# Patient Record
Sex: FEMALE | Race: WHITE | NOT HISPANIC OR LATINO | Employment: PART TIME | URBAN - METROPOLITAN AREA
[De-identification: names, ages, dates, MRNs, and addresses within clinical notes are randomized per-mention and may not be internally consistent; named-entity substitution may affect disease eponyms.]

---

## 2018-04-03 LAB
ABSOL LYMPHOCYTES (HISTORICAL): 2.6 K/UL (ref 0.5–4)
ALBUMIN SERPL BCP-MCNC: 4 G/DL (ref 3–5.2)
ALP SERPL-CCNC: 55 U/L (ref 43–122)
ALT SERPL W P-5'-P-CCNC: 41 U/L (ref 9–52)
AST SERPL W P-5'-P-CCNC: 30 U/L (ref 14–36)
BASOPHILS # BLD AUTO: 0 K/UL (ref 0–0.1)
BASOPHILS # BLD AUTO: 1 % (ref 0–1)
BILIRUB SERPL-MCNC: 0.1 MG/DL
BILIRUBIN DIRECT (HISTORICAL): 0.1 MG/DL
CHOLEST SERPL-MCNC: 212 MG/DL
CHOLEST/HDLC SERPL: 3.3 {RATIO}
DEPRECATED RDW RBC AUTO: 13.3 %
EOSINOPHIL # BLD AUTO: 0.1 K/UL (ref 0–0.4)
EOSINOPHIL NFR BLD AUTO: 2 % (ref 0–6)
HCT VFR BLD AUTO: 39.9 % (ref 36–46)
HDLC SERPL-MCNC: 64 MG/DL
HGB BLD-MCNC: 13.3 G/DL (ref 12–16)
LDL/HDL RATIO (HISTORICAL): 1.3
LDLC SERPL CALC-MCNC: 85 MG/DL
LYMPHOCYTES NFR BLD AUTO: 46 % (ref 25–45)
MCH RBC QN AUTO: 30.6 PG (ref 26–34)
MCHC RBC AUTO-ENTMCNC: 33.3 % (ref 31–36)
MCV RBC AUTO: 92 FL (ref 80–100)
MONOCYTES # BLD AUTO: 0.5 K/UL (ref 0.2–0.9)
MONOCYTES NFR BLD AUTO: 9 % (ref 1–10)
NEUTROPHILS ABS COUNT (HISTORICAL): 2.4 K/UL (ref 1.8–7.8)
NEUTS SEG NFR BLD AUTO: 42 % (ref 45–65)
PLATELET # BLD AUTO: 229 K/MCL (ref 150–450)
RBC # BLD AUTO: 4.34 M/MCL (ref 4–5.2)
TOTAL PROTEIN (HISTORICAL): 6.6 G/DL (ref 5.9–8.4)
TRIGL SERPL-MCNC: 315 MG/DL
VLDLC SERPL CALC-MCNC: 63 MG/DL (ref 0–40)
WBC # BLD AUTO: 5.7 K/MCL (ref 4.5–11)

## 2018-05-05 LAB
ABSOL LYMPHOCYTES (HISTORICAL): 2.4 K/UL (ref 0.5–4)
ALBUMIN SERPL BCP-MCNC: 4.2 G/DL (ref 3–5.2)
ALP SERPL-CCNC: 44 U/L (ref 43–122)
ALT SERPL W P-5'-P-CCNC: 34 U/L (ref 9–52)
AST SERPL W P-5'-P-CCNC: 29 U/L (ref 14–36)
BASOPHILS # BLD AUTO: 0 % (ref 0–1)
BASOPHILS # BLD AUTO: 0 K/UL (ref 0–0.1)
BILIRUB SERPL-MCNC: 0.3 MG/DL
BILIRUBIN DIRECT (HISTORICAL): 0.2 MG/DL
CHOLEST SERPL-MCNC: 228 MG/DL
CHOLEST/HDLC SERPL: 4.4 {RATIO}
DEPRECATED RDW RBC AUTO: 13.2 %
EOSINOPHIL # BLD AUTO: 0.1 K/UL (ref 0–0.4)
EOSINOPHIL NFR BLD AUTO: 2 % (ref 0–6)
HCT VFR BLD AUTO: 39.7 % (ref 36–46)
HDLC SERPL-MCNC: 52 MG/DL
HGB BLD-MCNC: 13.3 G/DL (ref 12–16)
LDL/HDL RATIO (HISTORICAL): 2.7
LDLC SERPL CALC-MCNC: 141 MG/DL
LYMPHOCYTES NFR BLD AUTO: 40 % (ref 25–45)
MCH RBC QN AUTO: 30.9 PG (ref 26–34)
MCHC RBC AUTO-ENTMCNC: 33.6 % (ref 31–36)
MCV RBC AUTO: 92 FL (ref 80–100)
MONOCYTES # BLD AUTO: 0.4 K/UL (ref 0.2–0.9)
MONOCYTES NFR BLD AUTO: 6 % (ref 1–10)
NEUTROPHILS ABS COUNT (HISTORICAL): 3.2 K/UL (ref 1.8–7.8)
NEUTS SEG NFR BLD AUTO: 52 % (ref 45–65)
PLATELET # BLD AUTO: 229 K/MCL (ref 150–450)
RBC # BLD AUTO: 4.32 M/MCL (ref 4–5.2)
TOTAL PROTEIN (HISTORICAL): 6.9 G/DL (ref 5.9–8.4)
TRIGL SERPL-MCNC: 174 MG/DL
VLDLC SERPL CALC-MCNC: 35 MG/DL (ref 0–40)
WBC # BLD AUTO: 6.1 K/MCL (ref 4.5–11)

## 2018-05-31 LAB
ABSOL LYMPHOCYTES (HISTORICAL): 3 K/UL (ref 0.5–4)
ALT SERPL W P-5'-P-CCNC: 29 U/L (ref 9–52)
AST SERPL W P-5'-P-CCNC: 22 U/L (ref 14–36)
BASOPHILS # BLD AUTO: 0 % (ref 0–1)
BASOPHILS # BLD AUTO: 0 K/UL (ref 0–0.1)
DEPRECATED RDW RBC AUTO: 13.1 %
EOSINOPHIL # BLD AUTO: 0.1 K/UL (ref 0–0.4)
EOSINOPHIL NFR BLD AUTO: 1 % (ref 0–6)
HCT VFR BLD AUTO: 40.3 % (ref 36–46)
HGB BLD-MCNC: 13.6 G/DL (ref 12–16)
LYMPHOCYTES NFR BLD AUTO: 49 % (ref 25–45)
MCH RBC QN AUTO: 31.8 PG (ref 26–34)
MCHC RBC AUTO-ENTMCNC: 33.9 % (ref 31–36)
MCV RBC AUTO: 94 FL (ref 80–100)
MONOCYTES # BLD AUTO: 0.4 K/UL (ref 0.2–0.9)
MONOCYTES NFR BLD AUTO: 6 % (ref 1–10)
NEUTROPHILS ABS COUNT (HISTORICAL): 2.8 K/UL (ref 1.8–7.8)
NEUTS SEG NFR BLD AUTO: 44 % (ref 45–65)
PLATELET # BLD AUTO: 248 K/MCL (ref 150–450)
RBC # BLD AUTO: 4.3 M/MCL (ref 4–5.2)
TRIGL SERPL-MCNC: 133 MG/DL
WBC # BLD AUTO: 6.2 K/MCL (ref 4.5–11)

## 2018-06-26 ENCOUNTER — OFFICE VISIT (OUTPATIENT)
Dept: FAMILY MEDICINE CLINIC | Facility: CLINIC | Age: 53
End: 2018-06-26
Payer: COMMERCIAL

## 2018-06-26 VITALS
OXYGEN SATURATION: 98 % | TEMPERATURE: 97.6 F | DIASTOLIC BLOOD PRESSURE: 86 MMHG | RESPIRATION RATE: 14 BRPM | HEART RATE: 73 BPM | HEIGHT: 64 IN | WEIGHT: 176.8 LBS | SYSTOLIC BLOOD PRESSURE: 128 MMHG | BODY MASS INDEX: 30.19 KG/M2

## 2018-06-26 DIAGNOSIS — L70.0 ACNE VULGARIS: Primary | ICD-10-CM

## 2018-06-26 DIAGNOSIS — R21 RASH: ICD-10-CM

## 2018-06-26 PROBLEM — R79.89 LOW VITAMIN D LEVEL: Status: ACTIVE | Noted: 2018-06-26

## 2018-06-26 PROBLEM — G47.00 INSOMNIA: Status: ACTIVE | Noted: 2018-02-06

## 2018-06-26 PROBLEM — L70.9 ACNE: Status: ACTIVE | Noted: 2018-02-06

## 2018-06-26 PROBLEM — G43.009 MIGRAINE WITHOUT AURA OR STATUS MIGRAINOSUS: Status: ACTIVE | Noted: 2018-06-26

## 2018-06-26 PROCEDURE — 99214 OFFICE O/P EST MOD 30 MIN: CPT | Performed by: INTERNAL MEDICINE

## 2018-06-26 RX ORDER — ERGOCALCIFEROL 1.25 MG/1
CAPSULE ORAL
Refills: 3 | COMMUNITY
Start: 2018-06-18 | End: 2018-08-01 | Stop reason: SDUPTHER

## 2018-06-26 RX ORDER — CLINDAMYCIN PHOSPHATE 10 MG/G
GEL TOPICAL 2 TIMES DAILY
Qty: 30 G | Refills: 0 | Status: SHIPPED | OUTPATIENT
Start: 2018-06-26 | End: 2018-08-01 | Stop reason: SURG

## 2018-06-26 RX ORDER — CEFUROXIME AXETIL 500 MG/1
500 TABLET ORAL DAILY
Qty: 30 TABLET | Refills: 1 | Status: SHIPPED | OUTPATIENT
Start: 2018-06-26 | End: 2018-07-26

## 2018-06-26 RX ORDER — ZOLMITRIPTAN 2.5 MG/1
TABLET, FILM COATED ORAL
Refills: 3 | COMMUNITY
Start: 2018-06-18 | End: 2018-10-13 | Stop reason: SDUPTHER

## 2018-06-26 RX ORDER — ISOTRETINOIN 40 MG/1
40 CAPSULE ORAL DAILY
Refills: 0 | COMMUNITY
Start: 2018-06-05 | End: 2019-04-17

## 2018-06-26 NOTE — PROGRESS NOTES
Assessment/Plan:         Diagnoses and all orders for this visit:    Acne vulgaris  -     Ambulatory referral to Allergy; Future  -     clindamycin (CLINDAGEL) 1 % gel; Apply topically 2 (two) times a day  -     cefuroxime (CEFTIN) 500 mg tablet; Take 1 tablet (500 mg total) by mouth daily for 30 days    RTC in 1 mo  Rash  -     Ambulatory referral to Allergy; Future  -     cefuroxime (CEFTIN) 500 mg tablet; Take 1 tablet (500 mg total) by mouth daily for 30 days    Other orders  -     ergocalciferol (VITAMIN D2) 50,000 units; TAKE ONE CAPSULE BY MOUTH ONE TIME PER WEEK  -     ISOtretinoin (ACCUTANE) 40 MG capsule; Take 40 mg by mouth daily  -     ZOLMitriptan (ZOMIG) 2 5 MG tablet; TAKE 1 TABLET BY MOUTH ONCE-MAY REPEAT AFTER 2 HOURS IF NEEDED,NO MORE THAN 10 MG/24 HRS        Subjective:      Patient ID: Laurel Hernandes is a 50 y o  female  68-year-old lady with long history of rash mainly on her face upper extremities, she was seen by a dermatologist is on acutane  at this time for the last 3 months,  With no significant improvement  She is tired of having this issue without any improvement  She would like to see or seek any help regarding this rash  Med list, allergy ,review of system all done in detail  And no other significant problems under previous blood test ultrasound and cardiac workup all reviewed in detail with patient  Rash   Pertinent negatives include no congestion, cough, diarrhea, eye pain, fatigue, fever, shortness of breath or sore throat  The following portions of the patient's history were reviewed and updated as appropriate: allergies, current medications, past family history, past medical history, past social history, past surgical history and problem list     Review of Systems   Constitutional: Negative for chills, fatigue and fever  HENT: Negative for congestion, facial swelling, sore throat, trouble swallowing and voice change      Eyes: Negative for pain, discharge and visual disturbance  Respiratory: Negative for cough, shortness of breath and wheezing  Cardiovascular: Negative for chest pain, palpitations and leg swelling  Gastrointestinal: Negative for abdominal pain, blood in stool, constipation, diarrhea and nausea  Endocrine: Negative for polydipsia, polyphagia and polyuria  Genitourinary: Negative for difficulty urinating, hematuria and urgency  Musculoskeletal: Negative for arthralgias and myalgias  Skin: Positive for rash  Neurological: Negative for dizziness, tremors, weakness and headaches  Hematological: Negative for adenopathy  Does not bruise/bleed easily  Psychiatric/Behavioral: Negative for dysphoric mood, sleep disturbance and suicidal ideas  Objective:      /86 (BP Location: Left arm, Patient Position: Sitting, Cuff Size: Adult)   Pulse 73   Temp 97 6 °F (36 4 °C) (Oral)   Resp 14   Ht 5' 4" (1 626 m)   Wt 80 2 kg (176 lb 12 8 oz)   LMP 06/06/2018 (Approximate)   SpO2 98%   Breastfeeding? No   BMI 30 35 kg/m²          Physical Exam   Constitutional: She is oriented to person, place, and time  She appears well-nourished  No distress  HENT:   Head: Normocephalic  Mouth/Throat: Oropharynx is clear and moist  No oropharyngeal exudate  Eyes: Conjunctivae are normal  Pupils are equal, round, and reactive to light  No scleral icterus  Neck: Neck supple  No thyromegaly present  Cardiovascular: Normal rate, regular rhythm and normal heart sounds  No murmur heard  Pulmonary/Chest: Effort normal and breath sounds normal  No respiratory distress  She has no wheezes  She has no rales  Abdominal: Soft  Bowel sounds are normal  She exhibits no distension  There is no tenderness  There is no rebound and no guarding  Musculoskeletal: She exhibits no edema  Lymphadenopathy:     She has no cervical adenopathy  Neurological: She is alert and oriented to person, place, and time  Skin: Rash noted   Rash is maculopapular  There is erythema  Psychiatric: She has a normal mood and affect

## 2018-06-26 NOTE — PATIENT INSTRUCTIONS
Dermatitis   AMBULATORY CARE:   Dermatitis  is skin inflammation  You may have an itchy rash, redness, or swelling  You may also have bumps or blisters that crust over or ooze clear fluid  Call 911 if you have any of the following symptoms of anaphylaxis:   · Sudden trouble breathing    · Throat swelling and tightness    · Dizziness, lightheadedness, fainting, or confusion  Seek care immediately if:   · You develop a fever or have red streaks going up your arm or leg  · Your rash gets more swollen, red, or hot  Contact your healthcare provider if:   · Your skin blisters, oozes white or yellow pus, or has a foul-smelling discharge  · Your rash spreads or does not get better, even after treatment  · You have questions or concerns about your condition or care  Treatment for dermatitis  depends on the cause of your rash  You may need medicines to help decrease itching and inflammation or treat a bacterial infection  They may be given as a topical cream, shot, or a pill  Manage dermatitis:   · Apply a cool compress to your rash  This will help soothe your skin  · Keep your skin moist   Rub unscented cream or lotion on your skin to prevent dryness and itching  Do this right after a lukewarm bath or shower when your skin is still damp  · Avoid skin irritants  Do not use skin irritants, such as makeup, hair products, soaps, and cleansers  Use products that do not contain fragrances or dye  Follow up with your healthcare provider as directed:  Write down your questions so you remember to ask them during your visits  © 2017 [employer] Information is for End User's use only and may not be sold, redistributed or otherwise used for commercial purposes  All illustrations and images included in CareNotes® are the copyrighted property of [employer] , [employer]  or Faust[employer] Camilo  The above information is an  only   It is not intended as medical advice for individual conditions or treatments  Talk to your doctor, nurse or pharmacist before following any medical regimen to see if it is safe and effective for you

## 2018-07-03 ENCOUNTER — APPOINTMENT (OUTPATIENT)
Dept: LAB | Facility: HOSPITAL | Age: 53
End: 2018-07-03
Payer: COMMERCIAL

## 2018-07-03 ENCOUNTER — TRANSCRIBE ORDERS (OUTPATIENT)
Dept: ADMINISTRATIVE | Facility: HOSPITAL | Age: 53
End: 2018-07-03

## 2018-07-03 DIAGNOSIS — L90.5 SCAR CONDITION AND FIBROSIS OF SKIN: ICD-10-CM

## 2018-07-03 DIAGNOSIS — L98.8 VASCULAR DISORDER OF SKIN: ICD-10-CM

## 2018-07-03 DIAGNOSIS — L98.8 VASCULAR DISORDER OF SKIN: Primary | ICD-10-CM

## 2018-07-03 LAB
ALT SERPL W P-5'-P-CCNC: 36 U/L (ref 9–52)
AST SERPL W P-5'-P-CCNC: 26 U/L (ref 14–36)
BASOPHILS # BLD AUTO: 0 THOUSANDS/ΜL (ref 0–0.1)
BASOPHILS NFR BLD AUTO: 0 % (ref 0–1)
EOSINOPHIL # BLD AUTO: 0.1 THOUSAND/ΜL (ref 0–0.4)
EOSINOPHIL NFR BLD AUTO: 2 % (ref 0–6)
ERYTHROCYTE [DISTWIDTH] IN BLOOD BY AUTOMATED COUNT: 13.3 %
HCT VFR BLD AUTO: 39.7 % (ref 36–46)
HGB BLD-MCNC: 13.3 G/DL (ref 12–16)
LYMPHOCYTES # BLD AUTO: 2.6 THOUSANDS/ΜL (ref 0.5–4)
LYMPHOCYTES NFR BLD AUTO: 47 % (ref 20–50)
MCH RBC QN AUTO: 31 PG (ref 26–34)
MCHC RBC AUTO-ENTMCNC: 33.5 G/DL (ref 31–36)
MCV RBC AUTO: 93 FL (ref 80–100)
MONOCYTES # BLD AUTO: 0.3 THOUSAND/ΜL (ref 0.2–0.9)
MONOCYTES NFR BLD AUTO: 6 % (ref 1–10)
NEUTROPHILS # BLD AUTO: 2.5 THOUSANDS/ΜL (ref 1.8–7.8)
NEUTS SEG NFR BLD AUTO: 44 % (ref 45–65)
PLATELET # BLD AUTO: 245 THOUSANDS/UL (ref 150–450)
PMV BLD AUTO: 9.8 FL (ref 8.9–12.7)
RBC # BLD AUTO: 4.29 MILLION/UL (ref 4–5.2)
TRIGL SERPL-MCNC: 128 MG/DL
WBC # BLD AUTO: 5.6 THOUSAND/UL (ref 4.5–11)

## 2018-07-03 PROCEDURE — 84460 ALANINE AMINO (ALT) (SGPT): CPT

## 2018-07-03 PROCEDURE — 84450 TRANSFERASE (AST) (SGOT): CPT

## 2018-07-03 PROCEDURE — 84478 ASSAY OF TRIGLYCERIDES: CPT

## 2018-07-03 PROCEDURE — 36415 COLL VENOUS BLD VENIPUNCTURE: CPT

## 2018-07-03 PROCEDURE — 85025 COMPLETE CBC W/AUTO DIFF WBC: CPT

## 2018-07-06 ENCOUNTER — TRANSCRIBE ORDERS (OUTPATIENT)
Dept: ADMINISTRATIVE | Facility: HOSPITAL | Age: 53
End: 2018-07-06

## 2018-07-06 ENCOUNTER — APPOINTMENT (OUTPATIENT)
Dept: LAB | Facility: HOSPITAL | Age: 53
End: 2018-07-06
Payer: COMMERCIAL

## 2018-07-06 DIAGNOSIS — L90.5 SCAR CONDITION AND FIBROSIS OF SKIN: Primary | ICD-10-CM

## 2018-07-06 DIAGNOSIS — L90.5 SCAR CONDITION AND FIBROSIS OF SKIN: ICD-10-CM

## 2018-07-06 PROCEDURE — 87116 MYCOBACTERIA CULTURE: CPT

## 2018-07-06 PROCEDURE — 36415 COLL VENOUS BLD VENIPUNCTURE: CPT

## 2018-07-10 ENCOUNTER — APPOINTMENT (OUTPATIENT)
Dept: LAB | Facility: HOSPITAL | Age: 53
End: 2018-07-10
Payer: COMMERCIAL

## 2018-07-10 DIAGNOSIS — L98.8 VASCULAR DISORDER OF SKIN: ICD-10-CM

## 2018-07-10 DIAGNOSIS — L90.5 SCAR CONDITION AND FIBROSIS OF SKIN: ICD-10-CM

## 2018-07-10 PROCEDURE — 36415 COLL VENOUS BLD VENIPUNCTURE: CPT

## 2018-07-10 PROCEDURE — 86480 TB TEST CELL IMMUN MEASURE: CPT

## 2018-07-12 LAB
ANNOTATION COMMENT IMP: NORMAL
GAMMA INTERFERON BACKGROUND BLD IA-ACNC: 0.07 IU/ML
M TB IFN-G BLD-IMP: NEGATIVE
M TB IFN-G CD4+ BCKGRND COR BLD-ACNC: 0 IU/ML
M TB IFN-G CD4+ T-CELLS BLD-ACNC: 0.07 IU/ML
MITOGEN IGNF BLD-ACNC: 7.98 IU/ML
QUANTIFERON-TB GOLD IN TUBE: NORMAL
SERVICE CMNT-IMP: NORMAL

## 2018-08-01 ENCOUNTER — OFFICE VISIT (OUTPATIENT)
Dept: FAMILY MEDICINE CLINIC | Facility: CLINIC | Age: 53
End: 2018-08-01
Payer: COMMERCIAL

## 2018-08-01 VITALS
HEIGHT: 64 IN | WEIGHT: 169 LBS | OXYGEN SATURATION: 100 % | DIASTOLIC BLOOD PRESSURE: 82 MMHG | HEART RATE: 76 BPM | BODY MASS INDEX: 28.85 KG/M2 | RESPIRATION RATE: 14 BRPM | TEMPERATURE: 98 F | SYSTOLIC BLOOD PRESSURE: 128 MMHG

## 2018-08-01 DIAGNOSIS — E55.9 VITAMIN D DEFICIENCY: Primary | ICD-10-CM

## 2018-08-01 DIAGNOSIS — L93.0 LUPUS ERYTHEMATOSUS, UNSPECIFIED FORM: ICD-10-CM

## 2018-08-01 DIAGNOSIS — R21 RASH: ICD-10-CM

## 2018-08-01 PROCEDURE — 99214 OFFICE O/P EST MOD 30 MIN: CPT | Performed by: INTERNAL MEDICINE

## 2018-08-01 RX ORDER — ERGOCALCIFEROL 1.25 MG/1
50000 CAPSULE ORAL WEEKLY
Qty: 4 CAPSULE | Refills: 3 | Status: SHIPPED | OUTPATIENT
Start: 2018-08-01 | End: 2018-08-07 | Stop reason: SDUPTHER

## 2018-08-01 NOTE — PROGRESS NOTES
Assessment/Plan:         Diagnoses and all orders for this visit:    Vitamin D deficiency :  -     ergocalciferol (VITAMIN D2) 50,000 units; Take 1 capsule (50,000 Units total) by mouth once a week  RTC in 3 mos w blood work    Lupus erythematosus, unspecified form; ?? Rheumatology consult   -     Sedimentation rate, automated; Future  -     HEATHER Screen w/ Reflex to Titer/Pattern; Future  -     HLA-B27 DNA Typing; Future  -     Anti-DNA antibody, double-stranded; Future  -     Basic metabolic panel; Future  -     CBC and differential; Future  -     Urinalysis with microscopic  -     Hepatic function panel; Future  -     Ambulatory referral to Rheumatology; Future    Rash: ?? R/O skin lupus     -     Sedimentation rate, automated; Future        Subjective:      Patient ID: Becky Heller is a 50 y o  female  Nice 50 y o lady with chronic recurrent skin rash    She has been seen dermatology    Recent blood work and med list reviewed w pt in detail           The following portions of the patient's history were reviewed and updated as appropriate: allergies, current medications, past family history, past medical history, past social history, past surgical history and problem list     Review of Systems   Constitutional: Positive for fatigue  Negative for chills and fever  HENT: Positive for postnasal drip and rhinorrhea  Negative for congestion, facial swelling, sore throat, trouble swallowing and voice change  Eyes: Negative for pain, discharge and visual disturbance  Respiratory: Negative for cough, shortness of breath and wheezing  Cardiovascular: Negative for chest pain, palpitations and leg swelling  Gastrointestinal: Negative for abdominal pain, blood in stool, constipation, diarrhea and nausea  Endocrine: Negative for polydipsia, polyphagia and polyuria  Genitourinary: Negative for difficulty urinating, hematuria and urgency  Musculoskeletal: Positive for arthralgias and myalgias     Skin: Positive for rash  Pt has been seen dermatology, skin bx showed  Possible ,Lupus ? ? Neurological: Negative for dizziness, tremors, weakness and headaches  Hematological: Negative for adenopathy  Does not bruise/bleed easily  Psychiatric/Behavioral: Negative for dysphoric mood, sleep disturbance and suicidal ideas  Objective:      /82 (BP Location: Left arm, Patient Position: Sitting, Cuff Size: Standard)   Pulse 76   Temp 98 °F (36 7 °C) (Oral)   Resp 14   Ht 5' 4" (1 626 m)   Wt 76 7 kg (169 lb)   SpO2 100%   BMI 29 01 kg/m²          Physical Exam   Constitutional: She is oriented to person, place, and time  She appears well-nourished  No distress  HENT:   Head: Normocephalic  Mouth/Throat: Oropharynx is clear and moist  No oropharyngeal exudate  Eyes: Conjunctivae are normal  Pupils are equal, round, and reactive to light  No scleral icterus  Neck: Neck supple  No thyromegaly present  Cardiovascular: Normal rate, regular rhythm and normal heart sounds  No murmur heard  Pulmonary/Chest: Effort normal and breath sounds normal  No respiratory distress  She has no wheezes  She has no rales  Abdominal: Soft  Bowel sounds are normal  She exhibits no distension  There is no tenderness  There is no rebound and no guarding  Musculoskeletal: She exhibits tenderness  She exhibits no edema  Lymphadenopathy:     She has no cervical adenopathy  Neurological: She is alert and oriented to person, place, and time  A cranial nerve deficit is present  Coordination normal    Skin: Rash noted  There is erythema  Facial rash  , she has rash  On other parts of her Body also    Psychiatric: She has a normal mood and affect

## 2018-08-02 ENCOUNTER — APPOINTMENT (OUTPATIENT)
Dept: LAB | Facility: HOSPITAL | Age: 53
End: 2018-08-02
Payer: COMMERCIAL

## 2018-08-02 ENCOUNTER — TRANSCRIBE ORDERS (OUTPATIENT)
Dept: ADMINISTRATIVE | Facility: HOSPITAL | Age: 53
End: 2018-08-02

## 2018-08-02 DIAGNOSIS — L98.8 VASCULAR DISORDER OF SKIN: ICD-10-CM

## 2018-08-02 DIAGNOSIS — R21 RASH: ICD-10-CM

## 2018-08-02 DIAGNOSIS — L93.0 LUPUS ERYTHEMATOSUS, UNSPECIFIED FORM: ICD-10-CM

## 2018-08-02 LAB
ALBUMIN SERPL BCP-MCNC: 4.2 G/DL (ref 3–5.2)
ALP SERPL-CCNC: 53 U/L (ref 43–122)
ALT SERPL W P-5'-P-CCNC: 32 U/L (ref 9–52)
ANION GAP SERPL CALCULATED.3IONS-SCNC: 8 MMOL/L (ref 5–14)
AST SERPL W P-5'-P-CCNC: 26 U/L (ref 14–36)
BACTERIA UR QL AUTO: ABNORMAL /HPF
BASOPHILS # BLD AUTO: 0 THOUSANDS/ΜL (ref 0–0.1)
BASOPHILS NFR BLD AUTO: 1 % (ref 0–1)
BILIRUB DIRECT SERPL-MCNC: 0.1 MG/DL
BILIRUB SERPL-MCNC: 0.4 MG/DL
BILIRUB UR QL STRIP: NEGATIVE
BUN SERPL-MCNC: 11 MG/DL (ref 5–25)
CALCIUM SERPL-MCNC: 9.5 MG/DL (ref 8.4–10.2)
CHLORIDE SERPL-SCNC: 105 MMOL/L (ref 97–108)
CLARITY UR: CLEAR
CO2 SERPL-SCNC: 28 MMOL/L (ref 22–30)
COLOR UR: ABNORMAL
CREAT SERPL-MCNC: 0.63 MG/DL (ref 0.6–1.2)
EOSINOPHIL # BLD AUTO: 0.1 THOUSAND/ΜL (ref 0–0.4)
EOSINOPHIL NFR BLD AUTO: 1 % (ref 0–6)
ERYTHROCYTE [DISTWIDTH] IN BLOOD BY AUTOMATED COUNT: 13.6 %
ERYTHROCYTE [SEDIMENTATION RATE] IN BLOOD: 28 MM/HOUR (ref 1–20)
GFR SERPL CREATININE-BSD FRML MDRD: 105 ML/MIN/1.73SQ M
GLUCOSE P FAST SERPL-MCNC: 96 MG/DL (ref 70–99)
GLUCOSE UR STRIP-MCNC: NEGATIVE MG/DL
HCT VFR BLD AUTO: 40.6 % (ref 36–46)
HGB BLD-MCNC: 13.8 G/DL (ref 12–16)
HGB UR QL STRIP.AUTO: 10
KETONES UR STRIP-MCNC: NEGATIVE MG/DL
LEUKOCYTE ESTERASE UR QL STRIP: NEGATIVE
LYMPHOCYTES # BLD AUTO: 2.3 THOUSANDS/ΜL (ref 0.5–4)
LYMPHOCYTES NFR BLD AUTO: 42 % (ref 20–50)
MCH RBC QN AUTO: 31.6 PG (ref 26–34)
MCHC RBC AUTO-ENTMCNC: 34 G/DL (ref 31–36)
MCV RBC AUTO: 93 FL (ref 80–100)
MONOCYTES # BLD AUTO: 0.3 THOUSAND/ΜL (ref 0.2–0.9)
MONOCYTES NFR BLD AUTO: 5 % (ref 1–10)
NEUTROPHILS # BLD AUTO: 2.7 THOUSANDS/ΜL (ref 1.8–7.8)
NEUTS SEG NFR BLD AUTO: 51 % (ref 45–65)
NITRITE UR QL STRIP: NEGATIVE
NON-SQ EPI CELLS URNS QL MICRO: ABNORMAL /HPF
PH UR STRIP.AUTO: 6 [PH] (ref 4.5–8)
PLATELET # BLD AUTO: 246 THOUSANDS/UL (ref 150–450)
PMV BLD AUTO: 9.3 FL (ref 8.9–12.7)
POTASSIUM SERPL-SCNC: 4.9 MMOL/L (ref 3.6–5)
PROT SERPL-MCNC: 7.6 G/DL (ref 5.9–8.4)
PROT UR STRIP-MCNC: NEGATIVE MG/DL
RBC # BLD AUTO: 4.35 MILLION/UL (ref 4–5.2)
RBC #/AREA URNS AUTO: ABNORMAL /HPF
SODIUM SERPL-SCNC: 141 MMOL/L (ref 137–147)
SP GR UR STRIP.AUTO: 1.01 (ref 1–1.04)
TRIGL SERPL-MCNC: 141 MG/DL
UROBILINOGEN UA: NEGATIVE MG/DL
WBC # BLD AUTO: 5.3 THOUSAND/UL (ref 4.5–11)
WBC #/AREA URNS AUTO: ABNORMAL /HPF

## 2018-08-02 PROCEDURE — 84478 ASSAY OF TRIGLYCERIDES: CPT

## 2018-08-02 PROCEDURE — 85652 RBC SED RATE AUTOMATED: CPT

## 2018-08-02 PROCEDURE — 85025 COMPLETE CBC W/AUTO DIFF WBC: CPT

## 2018-08-02 PROCEDURE — 36415 COLL VENOUS BLD VENIPUNCTURE: CPT

## 2018-08-02 PROCEDURE — 81001 URINALYSIS AUTO W/SCOPE: CPT | Performed by: INTERNAL MEDICINE

## 2018-08-02 PROCEDURE — 86038 ANTINUCLEAR ANTIBODIES: CPT

## 2018-08-02 PROCEDURE — 81374 HLA I TYPING 1 ANTIGEN LR: CPT

## 2018-08-02 PROCEDURE — 80076 HEPATIC FUNCTION PANEL: CPT

## 2018-08-02 PROCEDURE — 86225 DNA ANTIBODY NATIVE: CPT

## 2018-08-02 PROCEDURE — 80048 BASIC METABOLIC PNL TOTAL CA: CPT

## 2018-08-03 LAB
DSDNA AB SER-ACNC: <1 IU/ML (ref 0–9)
RYE IGE QN: NEGATIVE

## 2018-08-07 DIAGNOSIS — E55.9 VITAMIN D DEFICIENCY: ICD-10-CM

## 2018-08-07 RX ORDER — ERGOCALCIFEROL 1.25 MG/1
50000 CAPSULE ORAL WEEKLY
Qty: 4 CAPSULE | Refills: 0 | Status: SHIPPED | OUTPATIENT
Start: 2018-08-07 | End: 2019-04-17

## 2018-08-09 LAB — HLA-B27 QL NAA+PROBE: NEGATIVE

## 2018-08-14 ENCOUNTER — TELEPHONE (OUTPATIENT)
Dept: FAMILY MEDICINE CLINIC | Facility: CLINIC | Age: 53
End: 2018-08-14

## 2018-08-14 ENCOUNTER — HOSPITAL ENCOUNTER (OUTPATIENT)
Dept: RADIOLOGY | Facility: HOSPITAL | Age: 53
Discharge: HOME | End: 2018-08-14
Payer: COMMERCIAL

## 2018-08-14 PROCEDURE — 71046 X-RAY EXAM CHEST 2 VIEWS: CPT

## 2018-08-14 NOTE — TELEPHONE ENCOUNTER
Patient's daughter called and would like the results of mother's blood work    Please call her at [phone]

## 2018-08-20 LAB — MYCOBACTERIUM BLD CULT: NORMAL

## 2018-09-05 ENCOUNTER — OFFICE VISIT (OUTPATIENT)
Dept: RHEUMATOLOGY | Facility: CLINIC | Age: 53
End: 2018-09-05
Payer: COMMERCIAL

## 2018-09-05 VITALS
WEIGHT: 169 LBS | BODY MASS INDEX: 28.85 KG/M2 | SYSTOLIC BLOOD PRESSURE: 130 MMHG | DIASTOLIC BLOOD PRESSURE: 80 MMHG | HEIGHT: 64 IN | HEART RATE: 71 BPM

## 2018-09-05 DIAGNOSIS — L98.8 LUPUS MILIARIS DISSEMINATUS FACIEI: Primary | ICD-10-CM

## 2018-09-05 DIAGNOSIS — L93.2 OTHER LOCAL LUPUS ERYTHEMATOSUS: ICD-10-CM

## 2018-09-05 PROCEDURE — 99204 OFFICE O/P NEW MOD 45 MIN: CPT | Performed by: INTERNAL MEDICINE

## 2018-09-05 RX ORDER — PIMECROLIMUS 10 MG/G
CREAM TOPICAL 2 TIMES DAILY
Qty: 30 G | Refills: 3 | Status: SHIPPED | OUTPATIENT
Start: 2018-09-05 | End: 2019-04-17

## 2018-09-05 NOTE — LETTER
September 5, 2018     Oliver Norris MD  [address] [facility] [address]    Patient: Belkys Andrade   YOB: 1965   Date of Visit: 9/5/2018       Dear Dr Donte Alba: Thank you for referring Belkys Andrade to me for evaluation  Below are my notes for this consultation  If you have questions, please do not hesitate to call me  I look forward to following your patient along with you  Sincerely,        Jillian Caba DO        CC: MD Jillian Nuno DO  9/5/2018  9:44 AM  Sign at close encounter  Assessment and Plan:   Patient is a 54yo female who presents for rheumatology consult regarding biopsy-proven Lupus Miliaris Disseminatus Faciei, which has been refractory to different topical and oral medications so far  This skin condition is very uncommon and is a form of granulomatous dermatitis, and is not actually true cutaneous lupus  It is not typically associated with an underlying systemic autoimmune condition such as systemic lupus, and recent evaluation on her labs revealed a negative HEATHER and dsDNA, essentially ruling out SLE  ROS and exam today also do not suggest an underlying systemic inflammatory or autoimmune condition, and she appears to have a localized inflammatory granulomatous condition with cutaneous involvement only  We discussed that there are other treatment options to try, including both topical and oral medications for this condition that she has not yet tried  Literature suggests there is good data for topical calcineurin inhibitors like topical pimecrolimus and tacrolimus  In the bag of prior therapies she brought with her, there is a small fairly full tube of pimecrolimus, and she is not certain how long she tried this  I would like to retry pimecrolimus at this point since there is good data for effectiveness in this type of skin condition and she was agreeable  She will apply twice daily topically for the next few weeks until our f/u   Otherwise, may consider trying Tacrolimus which is in the same class, or other systemic therapies like Dapsone  Will also touch base with a colleague at [facility] who is a specialized rheumatic dermatologist with more experience with these skin conditions to get additional input for treatment  She will get some additional labs today to complete a thorough workup to rule out underlying systemic disease, but again suspicion is low for this  Plan:  Diagnoses and all orders for this visit:    Lupus miliaris disseminatus faciei  -     Chronic Hepatitis Panel  -     Glucose 6 phosphate dehydrogenase; Future  -     C3 complement  -     C4 complement  -     Sjogren's Antibodies; Future  -     Nuclear antigen antibody; Future  -     pimecrolimus (ELIDEL) 1 % cream; Apply topically 2 (two) times a day    Follow-up plan: F/u with me in 6 weeks at [facility]  Johnna Cote is a 50 y o   female who presents for rheumatology consult by request of Dr Davis Severin for cutaneous "lupus"  Patient presents with her daughter to the visit today  She reports she had onset of skin rash about 18 months ago on her face, and to a lesser degree on her bilateral arms  Rash appeared similar to acne on her face, has raised lesions diffusely but worst surrounding her eyes and perioral region  The arm rash is less and comes and goes sporadically without any specific treatment, but the facial lesions have been a persistent issue  She brought in the biopsy report from biopsy of both her face and an arm lesion done on 3/16/18 at a general dermatology office in [address] [place] [address] states the lesions are consistent with Lupus Miliaris Disseminatus Faciei; AFB staining was negative, and quant gold for TB was negative  She has tried multiple topical and oral meds which include: isotretinoin (pills and topical), minocycline, hydrocortisone, plaquenil, Flagyl, Azelaic acid     These are all of unclear duration and combinations, but she states she would try for months at a time and would initially seem to work, but then would stop working  She is very frustrated and "hopeless "     Recent lab testing revealed negative HEATHER, quant gold, dsDNA, normal CBC and CMP  She reports initially when she had onset of rash, she had some nonspecific joint pain in the right shoulder and knee, but this has since resolved about 1 year ago  No continued joint pain or stiffness anywhere  Initially also had dry eyes, but this has mostly resolved too  She currently has no other complaints other than her skin rash and anxiety related to persistence of it without any effective treatment so far  Denies photosensitivity, rashes, psoriasis, sicca symptoms, oral or nasal ulcers, alopecia, Raynaud's, h/o pericarditis or pleurisy, h/o blood clots or miscarriages  Review of Systems  Review of Systems   Constitutional: Negative for chills, fatigue, fever and unexpected weight change  HENT: Negative for mouth sores and trouble swallowing  Eyes: Negative for pain and visual disturbance  Respiratory: Negative for cough and shortness of breath  Cardiovascular: Negative for chest pain and leg swelling  Gastrointestinal: Negative for abdominal pain, blood in stool, constipation, diarrhea and nausea  Genitourinary: Negative for hematuria  Musculoskeletal: Negative for arthralgias, back pain, joint swelling and myalgias  Skin: Positive for rash  Neurological: Negative for weakness and numbness  Hematological: Negative for adenopathy  Psychiatric/Behavioral: Negative for sleep disturbance  The patient is nervous/anxious          Allergies  Allergies   Allergen Reactions    Pylera [Bis Subcit-Metronid-Tetracyc] Nausea Only    Sulfa Antibiotics Nausea Only       Home Medications    Current Outpatient Prescriptions:     ergocalciferol (VITAMIN D2) 50,000 units, Take 1 capsule (50,000 Units total) by mouth once a week, Disp: 4 capsule, Rfl: 0    ZOLMitriptan (ZOMIG) 2 5 MG tablet, TAKE 1 TABLET BY MOUTH ONCE-MAY REPEAT AFTER 2 HOURS IF NEEDED,NO MORE THAN 10 MG/24 HRS, Disp: , Rfl: 3    ISOtretinoin (ACCUTANE) 40 MG capsule, Take 40 mg by mouth daily, Disp: , Rfl: 0    pimecrolimus (ELIDEL) 1 % cream, Apply topically 2 (two) times a day, Disp: 30 g, Rfl: 3    Past Medical History  Past Medical History:   Diagnosis Date    Acne 02/06/2018    Headache 02/06/2018    Insomnia 02/06/2018    Vitamin D deficiency        Past Surgical History   Past Surgical History:   Procedure Laterality Date    TONSILLECTOMY         Family History  No known family history of autoimmune or inflammatory diseases  Family History   Problem Relation Age of Onset    No Known Problems Mother     No Known Problems Father        Social History  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   History   Alcohol Use No     History   Drug Use No     History   Smoking Status    Never Smoker   Smokeless Tobacco    Never Used       Objective:    Vitals:    09/05/18 0801   BP: 130/80   BP Location: Left arm   Patient Position: Sitting   Cuff Size: Adult   Pulse: 71   Weight: 76 7 kg (169 lb)   Height: 5' 4" (1 626 m)       Physical Exam   Constitutional: She appears well-developed and well-nourished  She is cooperative  No distress  HENT:   Head: Normocephalic  Mouth/Throat: Oropharynx is clear and moist and mucous membranes are normal    Eyes: Conjunctivae and EOM are normal  No scleral icterus  Neck: No thyromegaly present  Cardiovascular: Normal rate, regular rhythm, S1 normal and S2 normal   Exam reveals no friction rub  No murmur heard  Pulmonary/Chest: Breath sounds normal  No respiratory distress  She has no wheezes  She has no rhonchi  She has no rales  Musculoskeletal:   No soft tissue tenderness  No joint tenderness or swelling  Lymphadenopathy:     She has no cervical adenopathy  Neurological: She is alert     Skin: Skin is warm and dry  Face with diffuse rash but increased in periorbital and perioral areas, crops of red-yellow dome shaped papules   Upper extremities have few scattered faint pink papules  Psychiatric: She has a normal mood and affect  Imaging:   Chest XR 8/14/18: IMPRESSION:  No acute cardiopulmonary disease  Labs:   Appointment on 08/02/2018   Component Date Value Ref Range Status    Sed Rate 08/02/2018 28* 1 - 20 mm/hour Final    HEATHER 08/02/2018 Negative  Negative Final    ds DNA Ab 08/02/2018 <1  0 - 9 IU/mL Final                                       Negative      <5                                     Equivocal  5 - 9                                     Positive      >9    Sodium 08/02/2018 141  137 - 147 mmol/L Final    Potassium 08/02/2018 4 9  3 6 - 5 0 mmol/L Final    Chloride 08/02/2018 105  97 - 108 mmol/L Final    CO2 08/02/2018 28  22 - 30 mmol/L Final    ANION GAP 08/02/2018 8  5 - 14 mmol/L Final    BUN 08/02/2018 11  5 - 25 mg/dL Final    Creatinine 08/02/2018 0 63  0 60 - 1 20 mg/dL Final    Standardized to IDMS reference method    Glucose, Fasting 08/02/2018 96  70 - 99 mg/dL Final      Specimen collection should occur prior to Sulfasalazine administration due to the potential for falsely depressed results  Specimen collection should occur prior to Sulfapyridine administration due to the potential for falsely elevated results      Calcium 08/02/2018 9 5  8 4 - 10 2 mg/dL Final    eGFR 08/02/2018 105  >60 ml/min/1 73sq m Final    WBC 08/02/2018 5 30  4 50 - 11 00 Thousand/uL Final    RBC 08/02/2018 4 35  4 00 - 5 20 Million/uL Final    Hemoglobin 08/02/2018 13 8  12 0 - 16 0 g/dL Final    Hematocrit 08/02/2018 40 6  36 0 - 46 0 % Final    MCV 08/02/2018 93  80 - 100 fL Final    MCH 08/02/2018 31 6  26 0 - 34 0 pg Final    MCHC 08/02/2018 34 0  31 0 - 36 0 g/dL Final    RDW 08/02/2018 13 6  <15 3 % Final    MPV 08/02/2018 9 3  8 9 - 12 7 fL Final    Platelets 08/02/2018 246  150 - 450 Thousands/uL Final    Neutrophils Relative 08/02/2018 51  45 - 65 % Final    Lymphocytes Relative 08/02/2018 42  20 - 50 % Final    Monocytes Relative 08/02/2018 5  1 - 10 % Final    Eosinophils Relative 08/02/2018 1  0 - 6 % Final    Basophils Relative 08/02/2018 1  0 - 1 % Final    Neutrophils Absolute 08/02/2018 2 70  1 80 - 7 80 Thousands/µL Final    Lymphocytes Absolute 08/02/2018 2 30  0 50 - 4 00 Thousands/µL Final    Monocytes Absolute 08/02/2018 0 30  0 20 - 0 90 Thousand/µL Final    Eosinophils Absolute 08/02/2018 0 10  0 00 - 0 40 Thousand/µL Final    Basophils Absolute 08/02/2018 0 00  0 00 - 0 10 Thousands/µL Final    Total Bilirubin 08/02/2018 0 40  <1 30 mg/dL Final    Bilirubin, Direct 08/02/2018 0 10  <0 40 mg/dL Final    Alkaline Phosphatase 08/02/2018 53  43 - 122 U/L Final    AST 08/02/2018 26  14 - 36 U/L Final      Specimen collection should occur prior to Sulfasalazine administration due to the potential for falsely depressed results   ALT 08/02/2018 32  9 - 52 U/L Final      Specimen collection should occur prior to Sulfasalazine administration due to the potential for falsely depressed results   Total Protein 08/02/2018 7 6  5 9 - 8 4 g/dL Final    Albumin 08/02/2018 4 2  3 0 - 5 2 g/dL Final    Triglycerides 08/02/2018 141  <150 mg/dL Final      Triglyceride:     Normal          <150 mg/dl     Borderline High 150-199 mg/dl     High            200-499 mg/dl        Very High       >499 mg/dl    Specimen collection should occur prior to N-Acetylcysteine or Metamizole administration due to the potential for falsely depressed results   HLA B27 08/02/2018 Negative   Final    HLA-B*27 Negative  B27 allele interpretation for all loci based on IMGT/HLA  database version 3 27  This test was developed and its performance characteristics  determined by [facility]   It has not been cleared or approved  by the Food and Drug Administration    [facility] CLIA ID Number [id number]  This test was performed using PCR (Polymerase Chain Reaction)/SSOP  (Sequence Specific Oligonucleotide Probes) technique  SBT (Sequence  Based Typing) and/or SSP (Sequence Specific Primers) may be used as  supplemental methods when necessary  Please contact [facility] Customer  Service at [phone] if you have any questions     Director of [facility]   Dr Mayo Fonseca, PhD   Office Visit on 08/01/2018   Component Date Value Ref Range Status    Clarity, UA 08/02/2018 Clear  Clear, Other Final    Color, UA 08/02/2018 Straw  Straw, Yellow, Pale Yellow Final    Specific Gravity, UA 08/02/2018 1 010  1 003 - 1 040 Final    pH, UA 08/02/2018 6 0  4 5 - 8 0 Final    Glucose, UA 08/02/2018 Negative  Negative mg/dl Final    Ketones, UA 08/02/2018 Negative  Negative mg/dl Final    Blood, UA 08/02/2018 10 0* Negative Final    Protein, UA 08/02/2018 Negative  Negative mg/dl Final    Nitrite, UA 08/02/2018 Negative  Negative Final    Bilirubin, UA 08/02/2018 Negative  Negative Final    Leukocytes, UA 08/02/2018 Negative  Negative Final    WBC, UA 08/02/2018 None Seen  None Seen, 0-5, 5-55, 5-65 /hpf Final    RBC, UA 08/02/2018 None Seen  None Seen, 0-5 /hpf Final    Bacteria, UA 08/02/2018 None Seen  None Seen, Occasional /hpf Final    Epithelial Cells 08/02/2018 Moderate* None Seen, Occasional /hpf Final    UROBILINOGEN UA 08/02/2018 Negative  1 0, Negative mg/dL Final

## 2018-09-05 NOTE — PROGRESS NOTES
Assessment and Plan:   Patient is a 56yo female who presents for rheumatology consult regarding biopsy-proven Lupus Miliaris Disseminatus Faciei, which has been refractory to different topical and oral medications so far  This skin condition is a form of granulomatous rosacea, and is not actually true cutaneous lupus, despite the misnomer  It is not typically associated with an underlying systemic autoimmune conditions such as systemic lupus, and recent evaluation on her labs revealed a negative HEATHER and dsDNA, essentially ruling out SLE  ROS and exam today also do not suggest an underlying systemic inflammatory or autoimmune condition, and 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  We discussed that I am not a dermatologist and since there is no evidence of underlying autoimmune disease, she likely will not need to continue following with me  She is frustrated that she has not made any progress under her current dermatologic care and was hoping for additional treatment options  Reviewing the treatment briefly, since I am not as familiar with this condition, there are other treatment options to try, including both topical and oral medications for this condition that she has not yet tried  Literature suggests there is good data for topical calcineurin inhibitors like topical pimecrolimus and tacrolimus  In the bag of prior therapies she brought with her, there is a small fairly full tube of pimecrolimus, and she is not certain how long she tried this  I would like her to retry pimecrolimus at this point since there is good data for effectiveness in this type of skin condition and she was agreeable  She will apply twice daily topically for the next few weeks until our f/u  Will also touch base with a colleague at [facility] who is a specialized rheumatic dermatologist with more experience with these skin conditions to get additional input for treatment   She will get some additional labs today to complete a thorough workup to rule out underlying systemic disease, but again suspicion is low for this  ADDENDUM: Spoke with a colleague at [facility] in rheumatic dermatology, who recommended dermatology referral to larger medical center like [facility] or [facility] for treatment, since this type of rosacea is difficult to treat  Will discuss with patient  Plan:  Diagnoses and all orders for this visit:    Lupus miliaris disseminatus faciei  -     Chronic Hepatitis Panel  -     Glucose 6 phosphate dehydrogenase; Future  -     C3 complement  -     C4 complement  -     Sjogren's Antibodies; Future  -     Nuclear antigen antibody; Future  -     pimecrolimus (ELIDEL) 1 % cream; Apply topically 2 (two) times a day    Follow-up plan: F/u with me in 6 weeks at [facility]  Opal Weinberg is a 50 y o   female who presents for rheumatology consult by request of Dr Michelle Joaquin for cutaneous "lupus"  Patient presents with her daughter to the visit today  She reports she had onset of skin rash about 18 months ago on her face, and to a lesser degree on her bilateral arms  Rash appeared similar to acne on her face, has raised lesions diffusely but worst surrounding her eyes and perioral region  The arm rash is less and comes and goes sporadically without any specific treatment, but the facial lesions have been a persistent issue  She brought in the biopsy report from biopsy of both her face and an arm lesion done on 3/16/18 at a general dermatology office in [address] [place] [address] states the lesions are consistent with Lupus Miliaris Disseminatus Faciei; AFB staining was negative, and quant gold for TB was negative  She has tried multiple topical and oral meds which include: isotretinoin (pills and topical), minocycline, hydrocortisone, plaquenil, Flagyl, Azelaic acid     These are all of unclear duration and combinations, but she states she would try for months at a time and would initially seem to work, but then would stop working  She is very frustrated and "hopeless "     Recent lab testing revealed negative HEATHER, quant gold, dsDNA, normal CBC and CMP  She reports initially when she had onset of rash, she had some nonspecific joint pain in the right shoulder and knee, but this has since resolved about 1 year ago  No continued joint pain or stiffness anywhere  Initially also had dry eyes, but this has mostly resolved too  She currently has no other complaints other than her skin rash and anxiety related to persistence of it without any effective treatment so far  Denies photosensitivity, rashes, psoriasis, sicca symptoms, oral or nasal ulcers, alopecia, Raynaud's, h/o pericarditis or pleurisy, h/o blood clots or miscarriages  Review of Systems  Review of Systems   Constitutional: Negative for chills, fatigue, fever and unexpected weight change  HENT: Negative for mouth sores and trouble swallowing  Eyes: Negative for pain and visual disturbance  Respiratory: Negative for cough and shortness of breath  Cardiovascular: Negative for chest pain and leg swelling  Gastrointestinal: Negative for abdominal pain, blood in stool, constipation, diarrhea and nausea  Genitourinary: Negative for hematuria  Musculoskeletal: Negative for arthralgias, back pain, joint swelling and myalgias  Skin: Positive for rash  Neurological: Negative for weakness and numbness  Hematological: Negative for adenopathy  Psychiatric/Behavioral: Negative for sleep disturbance  The patient is nervous/anxious          Allergies  Allergies   Allergen Reactions    Pylera [Bis Subcit-Metronid-Tetracyc] Nausea Only    Sulfa Antibiotics Nausea Only       Home Medications    Current Outpatient Prescriptions:     ergocalciferol (VITAMIN D2) 50,000 units, Take 1 capsule (50,000 Units total) by mouth once a week, Disp: 4 capsule, Rfl: 0    ZOLMitriptan (ZOMIG) 2 5 MG tablet, TAKE 1 TABLET BY MOUTH ONCE-MAY REPEAT AFTER 2 HOURS IF NEEDED,NO MORE THAN 10 MG/24 HRS, Disp: , Rfl: 3    ISOtretinoin (ACCUTANE) 40 MG capsule, Take 40 mg by mouth daily, Disp: , Rfl: 0    pimecrolimus (ELIDEL) 1 % cream, Apply topically 2 (two) times a day, Disp: 30 g, Rfl: 3    Past Medical History  Past Medical History:   Diagnosis Date    Acne 02/06/2018    Headache 02/06/2018    Insomnia 02/06/2018    Vitamin D deficiency        Past Surgical History   Past Surgical History:   Procedure Laterality Date    TONSILLECTOMY         Family History  No known family history of autoimmune or inflammatory diseases  Family History   Problem Relation Age of Onset    No Known Problems Mother     No Known Problems Father        Social History  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   History   Alcohol Use No     History   Drug Use No     History   Smoking Status    Never Smoker   Smokeless Tobacco    Never Used       Objective:    Vitals:    09/05/18 0801   BP: 130/80   BP Location: Left arm   Patient Position: Sitting   Cuff Size: Adult   Pulse: 71   Weight: 76 7 kg (169 lb)   Height: 5' 4" (1 626 m)       Physical Exam   Constitutional: She appears well-developed and well-nourished  She is cooperative  No distress  HENT:   Head: Normocephalic  Mouth/Throat: Oropharynx is clear and moist and mucous membranes are normal    Eyes: Conjunctivae and EOM are normal  No scleral icterus  Neck: No thyromegaly present  Cardiovascular: Normal rate, regular rhythm, S1 normal and S2 normal   Exam reveals no friction rub  No murmur heard  Pulmonary/Chest: Breath sounds normal  No respiratory distress  She has no wheezes  She has no rhonchi  She has no rales  Musculoskeletal:   No soft tissue tenderness  No joint tenderness or swelling  Lymphadenopathy:     She has no cervical adenopathy  Neurological: She is alert  Skin: Skin is warm and dry     Face with diffuse rash but increased in periorbital and perioral areas, crops of red-yellow dome shaped papules   Upper extremities have few scattered faint pink papules  Psychiatric: She has a normal mood and affect  Imaging:   Chest XR 8/14/18: IMPRESSION:  No acute cardiopulmonary disease  Labs:   Appointment on 08/02/2018   Component Date Value Ref Range Status    Sed Rate 08/02/2018 28* 1 - 20 mm/hour Final    HEATHER 08/02/2018 Negative  Negative Final    ds DNA Ab 08/02/2018 <1  0 - 9 IU/mL Final                                       Negative      <5                                     Equivocal  5 - 9                                     Positive      >9    Sodium 08/02/2018 141  137 - 147 mmol/L Final    Potassium 08/02/2018 4 9  3 6 - 5 0 mmol/L Final    Chloride 08/02/2018 105  97 - 108 mmol/L Final    CO2 08/02/2018 28  22 - 30 mmol/L Final    ANION GAP 08/02/2018 8  5 - 14 mmol/L Final    BUN 08/02/2018 11  5 - 25 mg/dL Final    Creatinine 08/02/2018 0 63  0 60 - 1 20 mg/dL Final    Standardized to IDMS reference method    Glucose, Fasting 08/02/2018 96  70 - 99 mg/dL Final      Specimen collection should occur prior to Sulfasalazine administration due to the potential for falsely depressed results  Specimen collection should occur prior to Sulfapyridine administration due to the potential for falsely elevated results      Calcium 08/02/2018 9 5  8 4 - 10 2 mg/dL Final    eGFR 08/02/2018 105  >60 ml/min/1 73sq m Final    WBC 08/02/2018 5 30  4 50 - 11 00 Thousand/uL Final    RBC 08/02/2018 4 35  4 00 - 5 20 Million/uL Final    Hemoglobin 08/02/2018 13 8  12 0 - 16 0 g/dL Final    Hematocrit 08/02/2018 40 6  36 0 - 46 0 % Final    MCV 08/02/2018 93  80 - 100 fL Final    MCH 08/02/2018 31 6  26 0 - 34 0 pg Final    MCHC 08/02/2018 34 0  31 0 - 36 0 g/dL Final    RDW 08/02/2018 13 6  <15 3 % Final    MPV 08/02/2018 9 3  8 9 - 12 7 fL Final    Platelets 14/25/6865 246  150 - 450 Thousands/uL Final    Neutrophils Relative 08/02/2018 51  45 - 65 % Final    Lymphocytes Relative 08/02/2018 42  20 - 50 % Final    Monocytes Relative 08/02/2018 5  1 - 10 % Final    Eosinophils Relative 08/02/2018 1  0 - 6 % Final    Basophils Relative 08/02/2018 1  0 - 1 % Final    Neutrophils Absolute 08/02/2018 2 70  1 80 - 7 80 Thousands/µL Final    Lymphocytes Absolute 08/02/2018 2 30  0 50 - 4 00 Thousands/µL Final    Monocytes Absolute 08/02/2018 0 30  0 20 - 0 90 Thousand/µL Final    Eosinophils Absolute 08/02/2018 0 10  0 00 - 0 40 Thousand/µL Final    Basophils Absolute 08/02/2018 0 00  0 00 - 0 10 Thousands/µL Final    Total Bilirubin 08/02/2018 0 40  <1 30 mg/dL Final    Bilirubin, Direct 08/02/2018 0 10  <0 40 mg/dL Final    Alkaline Phosphatase 08/02/2018 53  43 - 122 U/L Final    AST 08/02/2018 26  14 - 36 U/L Final      Specimen collection should occur prior to Sulfasalazine administration due to the potential for falsely depressed results   ALT 08/02/2018 32  9 - 52 U/L Final      Specimen collection should occur prior to Sulfasalazine administration due to the potential for falsely depressed results   Total Protein 08/02/2018 7 6  5 9 - 8 4 g/dL Final    Albumin 08/02/2018 4 2  3 0 - 5 2 g/dL Final    Triglycerides 08/02/2018 141  <150 mg/dL Final      Triglyceride:     Normal          <150 mg/dl     Borderline High 150-199 mg/dl     High            200-499 mg/dl        Very High       >499 mg/dl    Specimen collection should occur prior to N-Acetylcysteine or Metamizole administration due to the potential for falsely depressed results   HLA B27 08/02/2018 Negative   Final    HLA-B*27 Negative  B27 allele interpretation for all loci based on IMGT/HLA  database version 3 27  This test was developed and its performance characteristics  determined by [facility]   It has not been cleared or approved  by the Food and Drug Administration    [facility] CLIA ID Number [id number]  This test was performed using PCR (Polymerase Chain Reaction)/SSOP  (Sequence Specific Oligonucleotide Probes) technique  SBT (Sequence  Based Typing) and/or SSP (Sequence Specific Primers) may be used as  supplemental methods when necessary  Please contact [facility] Customer  Service at [phone] if you have any questions     Director of [facility]   Dr Jean Jones, PhD   Office Visit on 08/01/2018   Component Date Value Ref Range Status    Clarity, UA 08/02/2018 Clear  Clear, Other Final    Color, UA 08/02/2018 Straw  Straw, Yellow, Pale Yellow Final    Specific Gravity, UA 08/02/2018 1 010  1 003 - 1 040 Final    pH, UA 08/02/2018 6 0  4 5 - 8 0 Final    Glucose, UA 08/02/2018 Negative  Negative mg/dl Final    Ketones, UA 08/02/2018 Negative  Negative mg/dl Final    Blood, UA 08/02/2018 10 0* Negative Final    Protein, UA 08/02/2018 Negative  Negative mg/dl Final    Nitrite, UA 08/02/2018 Negative  Negative Final    Bilirubin, UA 08/02/2018 Negative  Negative Final    Leukocytes, UA 08/02/2018 Negative  Negative Final    WBC, UA 08/02/2018 None Seen  None Seen, 0-5, 5-55, 5-65 /hpf Final    RBC, UA 08/02/2018 None Seen  None Seen, 0-5 /hpf Final    Bacteria, UA 08/02/2018 None Seen  None Seen, Occasional /hpf Final    Epithelial Cells 08/02/2018 Moderate* None Seen, Occasional /hpf Final    UROBILINOGEN UA 08/02/2018 Negative  1 0, Negative mg/dL Final

## 2018-09-07 ENCOUNTER — TELEPHONE (OUTPATIENT)
Dept: RHEUMATOLOGY | Facility: CLINIC | Age: 53
End: 2018-09-07

## 2018-09-07 DIAGNOSIS — L98.8 LUPUS MILIARIS DISSEMINATUS FACIEI: Primary | ICD-10-CM

## 2018-09-07 NOTE — TELEPHONE ENCOUNTER
Spoke with patient, recommended seeing dermatology at major academic institution for further guidance in treatment, but she is unable to travel at this time  Referred more locally to Dr Bryan Pimentel who can hopefully provide some additional treatment option for her condition  Discussed that if her blood work for me comes back negative, she no longer needs rheum f/u  Will await blood work before canceling appointment

## 2018-09-12 ENCOUNTER — APPOINTMENT (OUTPATIENT)
Dept: LAB | Facility: HOSPITAL | Age: 53
End: 2018-09-12
Payer: COMMERCIAL

## 2018-09-12 DIAGNOSIS — L98.8 LUPUS MILIARIS DISSEMINATUS FACIEI: ICD-10-CM

## 2018-09-12 LAB
C3 SERPL-MCNC: 122 MG/DL (ref 90–180)
C4 SERPL-MCNC: 26 MG/DL (ref 10–40)
HBV CORE AB SER QL: NORMAL
HBV CORE IGM SER QL: NORMAL
HBV SURFACE AG SER QL: NORMAL
HCV AB SER QL: NORMAL

## 2018-09-12 PROCEDURE — 86803 HEPATITIS C AB TEST: CPT | Performed by: INTERNAL MEDICINE

## 2018-09-12 PROCEDURE — 36415 COLL VENOUS BLD VENIPUNCTURE: CPT | Performed by: INTERNAL MEDICINE

## 2018-09-12 PROCEDURE — 86160 COMPLEMENT ANTIGEN: CPT | Performed by: INTERNAL MEDICINE

## 2018-09-12 PROCEDURE — 86235 NUCLEAR ANTIGEN ANTIBODY: CPT

## 2018-09-12 PROCEDURE — 87340 HEPATITIS B SURFACE AG IA: CPT | Performed by: INTERNAL MEDICINE

## 2018-09-12 PROCEDURE — 82955 ASSAY OF G6PD ENZYME: CPT

## 2018-09-12 PROCEDURE — 86705 HEP B CORE ANTIBODY IGM: CPT | Performed by: INTERNAL MEDICINE

## 2018-09-12 PROCEDURE — 86704 HEP B CORE ANTIBODY TOTAL: CPT | Performed by: INTERNAL MEDICINE

## 2018-09-13 LAB
ENA RNP AB SER-ACNC: <0.2 AI (ref 0–0.9)
ENA SM AB SER-ACNC: 1.1 AI (ref 0–0.9)
ENA SS-A AB SER-ACNC: <0.2 AI (ref 0–0.9)
ENA SS-B AB SER-ACNC: <0.2 AI (ref 0–0.9)
G6PD BLD QN: 241 U/10E12 RBC (ref 146–376)
RBC # BLD AUTO: 4.4 X10E6/UL (ref 3.77–5.28)

## 2018-09-14 DIAGNOSIS — R21 RASH OF FACE: Primary | ICD-10-CM

## 2018-09-14 DIAGNOSIS — R76.8 POSITIVE SMITH ANTIBODY: ICD-10-CM

## 2018-09-20 ENCOUNTER — TELEPHONE (OUTPATIENT)
Dept: RHEUMATOLOGY | Facility: CLINIC | Age: 53
End: 2018-09-20

## 2018-09-22 ENCOUNTER — APPOINTMENT (OUTPATIENT)
Dept: LAB | Facility: HOSPITAL | Age: 53
End: 2018-09-22
Payer: COMMERCIAL

## 2018-09-22 DIAGNOSIS — R76.8 POSITIVE SMITH ANTIBODY: ICD-10-CM

## 2018-09-22 DIAGNOSIS — R21 RASH OF FACE: ICD-10-CM

## 2018-09-22 PROCEDURE — 36415 COLL VENOUS BLD VENIPUNCTURE: CPT

## 2018-09-22 PROCEDURE — 86235 NUCLEAR ANTIGEN ANTIBODY: CPT

## 2018-09-24 LAB
ENA RNP AB SER-ACNC: <0.2 AI (ref 0–0.9)
ENA SM AB SER-ACNC: 0.9 AI (ref 0–0.9)

## 2018-10-03 ENCOUNTER — TELEPHONE (OUTPATIENT)
Dept: RHEUMATOLOGY | Facility: CLINIC | Age: 53
End: 2018-10-03

## 2018-10-03 NOTE — TELEPHONE ENCOUNTER
Patient is calling because she had lab work done on 09/22 and she states she was supposed to receive a call with the results  She is asking for a call back

## 2018-10-03 NOTE — TELEPHONE ENCOUNTER
Left VM For patient/her daughter that blood test is negative, and she does not have lupus  I advised her to call back and cancel the appointment scheduled with me later this month if she is ok with that plan   Thanks

## 2018-10-13 DIAGNOSIS — G43.909 MIGRAINE WITHOUT STATUS MIGRAINOSUS, NOT INTRACTABLE, UNSPECIFIED MIGRAINE TYPE: Primary | ICD-10-CM

## 2018-10-15 RX ORDER — ZOLMITRIPTAN 2.5 MG/1
TABLET, FILM COATED ORAL
Qty: 8 TABLET | Refills: 3 | Status: SHIPPED | OUTPATIENT
Start: 2018-10-15 | End: 2021-01-19

## 2018-11-07 ENCOUNTER — TELEPHONE (OUTPATIENT)
Dept: OBGYN CLINIC | Facility: HOSPITAL | Age: 53
End: 2018-11-07

## 2018-11-07 NOTE — TELEPHONE ENCOUNTER
Patient is calling stating that the doctor she was referred to does not have any openings until may and she is wondering if there is another doctor you can recommend      Miranda herrera

## 2018-11-07 NOTE — TELEPHONE ENCOUNTER
Can you please call the patient (her daughter will likely answer as patient does not speak much english) and give her these other options:    [facility]  [address] [facility], [id number] [place] [address], [address] [place] [address] [facility]  [address], [facility]  [phone]    She should take an appointment with the first available provider, I do not have a specific provider to recommend at either group  Thanks

## 2018-11-15 ENCOUNTER — TELEPHONE (OUTPATIENT)
Dept: OBGYN CLINIC | Facility: HOSPITAL | Age: 53
End: 2018-11-15

## 2018-11-15 NOTE — TELEPHONE ENCOUNTER
[facility]  [phone]    Dr Jenna Oviedo    Patient is needing a copy of her most recent blood test results for her PCP  In addition her pharmacy is requesting preauthorization for medication       pimecrolimus (ELIDEL) 1 % cream [[id number]]   Order Details   Dose: -- Route: Topical Frequency: 2 times daily   Dispense Quantity:  30 g Refills:  3 Fills remaining:  --           Sig: Apply topically 2 (two) times a day          Written Date:  09/05/18 Expiration Date:  09/05/19     Start Date:  09/05/18 End Date:  --            Ordering Provider:  -- SOPHIE #:  -- NPI:  --    Authorizing Provider:  Diane Singer DO SOPHIE #:  [id number] NPI:  [id number]    Ordering User:  Diane Singer DO            Diagnosis Association: Lupus miliaris disseminatus faciei (L98 9)      Pharmacy:  [facility]/pharmacy #[id number] #[id number], [address] [place], [place] SOPHIE #:  [id number]

## 2018-11-16 NOTE — TELEPHONE ENCOUNTER
Bertram Later, can you please print all lab results that were done on 8/2/18, 9/12/18, and the one also from 9/22/18 and fax to PCP listed in chart? Thanks

## 2018-11-23 ENCOUNTER — TELEPHONE (OUTPATIENT)
Dept: OBGYN CLINIC | Facility: HOSPITAL | Age: 53
End: 2018-11-23

## 2018-11-23 NOTE — TELEPHONE ENCOUNTER
Patient is requesting status of prior authorization of cream  She is also requesting print outs mailed to her about recent labs  Please update patient

## 2018-11-26 NOTE — TELEPHONE ENCOUNTER
Mary Anne, do you have access to this prior auth portal while nereida is out to check on the status on this cream? See below  If so, please update patient and also let her know that we will get her labs mailed out today  Previously she requested a copy go to her PCP and not to her, and we did send to her PCP   Thanks

## 2018-11-27 ENCOUNTER — TELEPHONE (OUTPATIENT)
Dept: OBGYN CLINIC | Facility: HOSPITAL | Age: 53
End: 2018-11-27

## 2018-11-27 NOTE — TELEPHONE ENCOUNTER
Left detail message for Patient and informed her that the medication, Elidel cream has not yet been approved by her insurance  And that we are still waiting on the response  if she had further question that she may contact me

## 2018-11-27 NOTE — TELEPHONE ENCOUNTER
Johanny from [address] [place] [address] called to inform patient needs a prior authorization on elidel 1%v cream                Call back# [phone]  Francis Morton

## 2018-11-27 NOTE — TELEPHONE ENCOUNTER
Called Pharmacy and let them know that the prior Auth status is still in progress  The Pharmacist seemed to be within understanding

## 2018-11-27 NOTE — TELEPHONE ENCOUNTER
LMOM for Patient to call back to let her that her medication has not been approved yet; its still in progress from her pharmacy

## 2018-11-27 NOTE — TELEPHONE ENCOUNTER
Yes we are aware and submitted this last week I believe  Mary Anne, can you please let patient know that we have not heard back from her insurance yet regarding the cream? Were you able to [place] on the [place] for this? Thanks

## 2018-11-28 ENCOUNTER — TELEPHONE (OUTPATIENT)
Dept: OBGYN CLINIC | Facility: CLINIC | Age: 53
End: 2018-11-28

## 2018-11-28 NOTE — TELEPHONE ENCOUNTER
Yes that is fine to mail them, but she has also called about this 3 times and they have been mailed twice  Not sure what the issue is  But sure mail them again  Thanks

## 2018-11-28 NOTE — TELEPHONE ENCOUNTER
Patient Call requesting the status of her medication cream  Informed her that it is still waiting in insurance approval  And then she ask I can mail her the copy of her last lab results  Is it ok if I mail her out her last lab results?

## 2018-12-07 ENCOUNTER — TELEPHONE (OUTPATIENT)
Dept: OBGYN CLINIC | Facility: HOSPITAL | Age: 53
End: 2018-12-07

## 2018-12-07 NOTE — TELEPHONE ENCOUNTER
Patient called today 12/7/18 and states she still has not received letter  She would like to have you resend it  I did double check address on file is correct

## 2018-12-10 NOTE — TELEPHONE ENCOUNTER
Satya Dash, while you were away I had mailed her labs out per her request, and apparently it has been mailed out twice piror  I had even varied the patient address before sending it out to her  I don't want to inconvenient the patient by sending it out to her again and she's not receiving it  Maybe its best she just come in and pick? Please advise

## 2018-12-23 DIAGNOSIS — F41.9 ANXIETY: Primary | ICD-10-CM

## 2018-12-24 RX ORDER — ALPRAZOLAM 0.25 MG/1
TABLET ORAL
Qty: 30 TABLET | Refills: 1 | Status: SHIPPED | OUTPATIENT
Start: 2018-12-24 | End: 2019-04-17

## 2019-01-26 ENCOUNTER — APPOINTMENT (OUTPATIENT)
Dept: LAB | Facility: HOSPITAL | Age: 54
End: 2019-01-26
Payer: COMMERCIAL

## 2019-01-26 ENCOUNTER — TRANSCRIBE ORDERS (OUTPATIENT)
Dept: ADMINISTRATIVE | Facility: HOSPITAL | Age: 54
End: 2019-01-26

## 2019-01-26 DIAGNOSIS — L98.8 VASCULAR DISORDER OF SKIN: ICD-10-CM

## 2019-01-26 DIAGNOSIS — G43.909 MIGRAINE WITHOUT STATUS MIGRAINOSUS, NOT INTRACTABLE, UNSPECIFIED MIGRAINE TYPE: ICD-10-CM

## 2019-01-26 DIAGNOSIS — Z00.00 ROUTINE GENERAL MEDICAL EXAMINATION AT A HEALTH CARE FACILITY: ICD-10-CM

## 2019-01-26 DIAGNOSIS — I10 ESSENTIAL HYPERTENSION, MALIGNANT: ICD-10-CM

## 2019-01-26 DIAGNOSIS — R30.0 DYSURIA: Primary | ICD-10-CM

## 2019-01-26 LAB
ALBUMIN SERPL BCP-MCNC: 4.1 G/DL (ref 3–5.2)
ALP SERPL-CCNC: 51 U/L (ref 43–122)
ALT SERPL W P-5'-P-CCNC: 18 U/L (ref 9–52)
ANION GAP SERPL CALCULATED.3IONS-SCNC: 5 MMOL/L (ref 5–14)
AST SERPL W P-5'-P-CCNC: 24 U/L (ref 14–36)
BASOPHILS # BLD AUTO: 0 THOUSANDS/ΜL (ref 0–0.1)
BASOPHILS NFR BLD AUTO: 1 % (ref 0–1)
BILIRUB SERPL-MCNC: 0.2 MG/DL
BUN SERPL-MCNC: 13 MG/DL (ref 5–25)
CALCIUM SERPL-MCNC: 9.4 MG/DL (ref 8.4–10.2)
CHLORIDE SERPL-SCNC: 105 MMOL/L (ref 97–108)
CO2 SERPL-SCNC: 27 MMOL/L (ref 22–30)
CREAT SERPL-MCNC: 0.64 MG/DL (ref 0.6–1.2)
EOSINOPHIL # BLD AUTO: 0.1 THOUSAND/ΜL (ref 0–0.4)
EOSINOPHIL NFR BLD AUTO: 2 % (ref 0–6)
ERYTHROCYTE [DISTWIDTH] IN BLOOD BY AUTOMATED COUNT: 13.3 %
GFR SERPL CREATININE-BSD FRML MDRD: 104 ML/MIN/1.73SQ M
GLUCOSE P FAST SERPL-MCNC: 98 MG/DL (ref 70–99)
HCT VFR BLD AUTO: 40.5 % (ref 36–46)
HGB BLD-MCNC: 13.5 G/DL (ref 12–16)
LYMPHOCYTES # BLD AUTO: 2.8 THOUSANDS/ΜL (ref 0.5–4)
LYMPHOCYTES NFR BLD AUTO: 40 % (ref 25–45)
MCH RBC QN AUTO: 31.9 PG (ref 26–34)
MCHC RBC AUTO-ENTMCNC: 33.3 G/DL (ref 31–36)
MCV RBC AUTO: 96 FL (ref 80–100)
MONOCYTES # BLD AUTO: 0.4 THOUSAND/ΜL (ref 0.2–0.9)
MONOCYTES NFR BLD AUTO: 6 % (ref 1–10)
NEUTROPHILS # BLD AUTO: 3.6 THOUSANDS/ΜL (ref 1.8–7.8)
NEUTS SEG NFR BLD AUTO: 52 % (ref 45–65)
PLATELET # BLD AUTO: 237 THOUSANDS/UL (ref 150–450)
PMV BLD AUTO: 9.3 FL (ref 8.9–12.7)
POTASSIUM SERPL-SCNC: 5.1 MMOL/L (ref 3.6–5)
PROT SERPL-MCNC: 7.2 G/DL (ref 5.9–8.4)
RBC # BLD AUTO: 4.23 MILLION/UL (ref 4–5.2)
SODIUM SERPL-SCNC: 137 MMOL/L (ref 137–147)
WBC # BLD AUTO: 7 THOUSAND/UL (ref 4.5–11)

## 2019-01-26 PROCEDURE — 80053 COMPREHEN METABOLIC PANEL: CPT

## 2019-01-26 PROCEDURE — 85025 COMPLETE CBC W/AUTO DIFF WBC: CPT

## 2019-01-26 PROCEDURE — 36415 COLL VENOUS BLD VENIPUNCTURE: CPT

## 2019-01-26 PROCEDURE — 82955 ASSAY OF G6PD ENZYME: CPT

## 2019-01-28 LAB
G6PD BLD QN: 219 U/10E12 RBC (ref 146–376)
RBC # BLD AUTO: 4.1 X10E6/UL (ref 3.77–5.28)

## 2019-02-23 ENCOUNTER — APPOINTMENT (OUTPATIENT)
Dept: LAB | Facility: HOSPITAL | Age: 54
End: 2019-02-23
Payer: COMMERCIAL

## 2019-02-23 DIAGNOSIS — G43.909 MIGRAINE WITHOUT STATUS MIGRAINOSUS, NOT INTRACTABLE, UNSPECIFIED MIGRAINE TYPE: ICD-10-CM

## 2019-02-23 DIAGNOSIS — Z00.00 ROUTINE GENERAL MEDICAL EXAMINATION AT A HEALTH CARE FACILITY: ICD-10-CM

## 2019-02-23 DIAGNOSIS — I10 ESSENTIAL HYPERTENSION, MALIGNANT: ICD-10-CM

## 2019-02-23 DIAGNOSIS — R30.0 DYSURIA: ICD-10-CM

## 2019-02-23 LAB
ALBUMIN SERPL BCP-MCNC: 4.1 G/DL (ref 3–5.2)
ALP SERPL-CCNC: 48 U/L (ref 43–122)
ALT SERPL W P-5'-P-CCNC: 22 U/L (ref 9–52)
ANION GAP SERPL CALCULATED.3IONS-SCNC: 7 MMOL/L (ref 5–14)
AST SERPL W P-5'-P-CCNC: 28 U/L (ref 14–36)
BACTERIA UR QL AUTO: ABNORMAL /HPF
BASOPHILS # BLD AUTO: 0 THOUSANDS/ΜL (ref 0–0.1)
BASOPHILS NFR BLD AUTO: 0 % (ref 0–1)
BILIRUB SERPL-MCNC: 0.5 MG/DL
BILIRUB UR QL STRIP: NEGATIVE
BUN SERPL-MCNC: 11 MG/DL (ref 5–25)
CALCIUM SERPL-MCNC: 9.8 MG/DL (ref 8.4–10.2)
CHLORIDE SERPL-SCNC: 103 MMOL/L (ref 97–108)
CHOLEST SERPL-MCNC: 222 MG/DL
CLARITY UR: CLEAR
CO2 SERPL-SCNC: 28 MMOL/L (ref 22–30)
COLOR UR: ABNORMAL
CREAT SERPL-MCNC: 0.69 MG/DL (ref 0.6–1.2)
EOSINOPHIL # BLD AUTO: 0.1 THOUSAND/ΜL (ref 0–0.4)
EOSINOPHIL NFR BLD AUTO: 2 % (ref 0–6)
ERYTHROCYTE [DISTWIDTH] IN BLOOD BY AUTOMATED COUNT: 12.8 %
GFR SERPL CREATININE-BSD FRML MDRD: 101 ML/MIN/1.73SQ M
GLUCOSE P FAST SERPL-MCNC: 89 MG/DL (ref 70–99)
GLUCOSE UR STRIP-MCNC: NEGATIVE MG/DL
HCT VFR BLD AUTO: 38 % (ref 36–46)
HDLC SERPL-MCNC: 60 MG/DL (ref 40–59)
HGB BLD-MCNC: 13 G/DL (ref 12–16)
HGB UR QL STRIP.AUTO: NEGATIVE
KETONES UR STRIP-MCNC: NEGATIVE MG/DL
LDLC SERPL CALC-MCNC: 136 MG/DL
LEUKOCYTE ESTERASE UR QL STRIP: NEGATIVE
LYMPHOCYTES # BLD AUTO: 2.5 THOUSANDS/ΜL (ref 0.5–4)
LYMPHOCYTES NFR BLD AUTO: 41 % (ref 25–45)
MCH RBC QN AUTO: 32.4 PG (ref 26–34)
MCHC RBC AUTO-ENTMCNC: 34.1 G/DL (ref 31–36)
MCV RBC AUTO: 95 FL (ref 80–100)
MONOCYTES # BLD AUTO: 0.4 THOUSAND/ΜL (ref 0.2–0.9)
MONOCYTES NFR BLD AUTO: 7 % (ref 1–10)
NEUTROPHILS # BLD AUTO: 3 THOUSANDS/ΜL (ref 1.8–7.8)
NEUTS SEG NFR BLD AUTO: 50 % (ref 45–65)
NITRITE UR QL STRIP: NEGATIVE
NON-SQ EPI CELLS URNS QL MICRO: ABNORMAL /HPF
NONHDLC SERPL-MCNC: 162 MG/DL
PH UR STRIP.AUTO: 6 [PH] (ref 4.5–8)
PLATELET # BLD AUTO: 228 THOUSANDS/UL (ref 150–450)
PMV BLD AUTO: 9.1 FL (ref 8.9–12.7)
POTASSIUM SERPL-SCNC: 4.8 MMOL/L (ref 3.6–5)
PROT SERPL-MCNC: 6.9 G/DL (ref 5.9–8.4)
PROT UR STRIP-MCNC: NEGATIVE MG/DL
RBC # BLD AUTO: 4 MILLION/UL (ref 4–5.2)
RBC #/AREA URNS AUTO: ABNORMAL /HPF
SODIUM SERPL-SCNC: 138 MMOL/L (ref 137–147)
SP GR UR STRIP.AUTO: 1.01 (ref 1–1.04)
TRIGL SERPL-MCNC: 129 MG/DL
TSH SERPL DL<=0.05 MIU/L-ACNC: 2.25 UIU/ML (ref 0.47–4.68)
UROBILINOGEN UA: NEGATIVE MG/DL
WBC # BLD AUTO: 6 THOUSAND/UL (ref 4.5–11)
WBC #/AREA URNS AUTO: ABNORMAL /HPF

## 2019-02-23 PROCEDURE — 87086 URINE CULTURE/COLONY COUNT: CPT

## 2019-02-23 PROCEDURE — 80053 COMPREHEN METABOLIC PANEL: CPT

## 2019-02-23 PROCEDURE — 80061 LIPID PANEL: CPT

## 2019-02-23 PROCEDURE — 84443 ASSAY THYROID STIM HORMONE: CPT

## 2019-02-23 PROCEDURE — 81001 URINALYSIS AUTO W/SCOPE: CPT

## 2019-02-23 PROCEDURE — 85025 COMPLETE CBC W/AUTO DIFF WBC: CPT

## 2019-02-23 PROCEDURE — 36415 COLL VENOUS BLD VENIPUNCTURE: CPT

## 2019-02-24 LAB — BACTERIA UR CULT: NORMAL

## 2019-03-12 ENCOUNTER — APPOINTMENT (OUTPATIENT)
Dept: LAB | Facility: HOSPITAL | Age: 54
End: 2019-03-12
Payer: COMMERCIAL

## 2019-03-12 ENCOUNTER — TRANSCRIBE ORDERS (OUTPATIENT)
Dept: ADMINISTRATIVE | Facility: HOSPITAL | Age: 54
End: 2019-03-12

## 2019-03-12 DIAGNOSIS — L98.8 VASCULAR DISORDER OF SKIN: ICD-10-CM

## 2019-03-12 DIAGNOSIS — L98.8 VASCULAR DISORDER OF SKIN: Primary | ICD-10-CM

## 2019-03-12 LAB
ALBUMIN SERPL BCP-MCNC: 4.1 G/DL (ref 3–5.2)
ALP SERPL-CCNC: 50 U/L (ref 43–122)
ALT SERPL W P-5'-P-CCNC: 22 U/L (ref 9–52)
ANION GAP SERPL CALCULATED.3IONS-SCNC: 7 MMOL/L (ref 5–14)
AST SERPL W P-5'-P-CCNC: 26 U/L (ref 14–36)
BASOPHILS # BLD AUTO: 0 THOUSANDS/ΜL (ref 0–0.1)
BASOPHILS NFR BLD AUTO: 0 % (ref 0–1)
BILIRUB SERPL-MCNC: 0.2 MG/DL
BUN SERPL-MCNC: 15 MG/DL (ref 5–25)
CALCIUM SERPL-MCNC: 9.2 MG/DL (ref 8.4–10.2)
CHLORIDE SERPL-SCNC: 101 MMOL/L (ref 97–108)
CHOLEST SERPL-MCNC: 206 MG/DL
CO2 SERPL-SCNC: 27 MMOL/L (ref 22–30)
CREAT SERPL-MCNC: 0.66 MG/DL (ref 0.6–1.2)
EOSINOPHIL # BLD AUTO: 0.1 THOUSAND/ΜL (ref 0–0.4)
EOSINOPHIL NFR BLD AUTO: 1 % (ref 0–6)
ERYTHROCYTE [DISTWIDTH] IN BLOOD BY AUTOMATED COUNT: 13.1 %
GFR SERPL CREATININE-BSD FRML MDRD: 103 ML/MIN/1.73SQ M
GLUCOSE P FAST SERPL-MCNC: 91 MG/DL (ref 70–99)
HCT VFR BLD AUTO: 39.8 % (ref 36–46)
HDLC SERPL-MCNC: 56 MG/DL (ref 40–59)
HGB BLD-MCNC: 13.1 G/DL (ref 12–16)
LDLC SERPL CALC-MCNC: 123 MG/DL
LYMPHOCYTES # BLD AUTO: 2.6 THOUSANDS/ΜL (ref 0.5–4)
LYMPHOCYTES NFR BLD AUTO: 38 % (ref 25–45)
MCH RBC QN AUTO: 31.1 PG (ref 26–34)
MCHC RBC AUTO-ENTMCNC: 32.8 G/DL (ref 31–36)
MCV RBC AUTO: 95 FL (ref 80–100)
MONOCYTES # BLD AUTO: 0.4 THOUSAND/ΜL (ref 0.2–0.9)
MONOCYTES NFR BLD AUTO: 6 % (ref 1–10)
NEUTROPHILS # BLD AUTO: 3.8 THOUSANDS/ΜL (ref 1.8–7.8)
NEUTS SEG NFR BLD AUTO: 54 % (ref 45–65)
NONHDLC SERPL-MCNC: 150 MG/DL
PLATELET # BLD AUTO: 266 THOUSANDS/UL (ref 150–450)
PMV BLD AUTO: 9.5 FL (ref 8.9–12.7)
POTASSIUM SERPL-SCNC: 4.8 MMOL/L (ref 3.6–5)
PROT SERPL-MCNC: 6.9 G/DL (ref 5.9–8.4)
RBC # BLD AUTO: 4.2 MILLION/UL (ref 4–5.2)
SODIUM SERPL-SCNC: 135 MMOL/L (ref 137–147)
TRIGL SERPL-MCNC: 133 MG/DL
TSH SERPL DL<=0.05 MIU/L-ACNC: 1.42 UIU/ML (ref 0.47–4.68)
WBC # BLD AUTO: 6.9 THOUSAND/UL (ref 4.5–11)

## 2019-03-12 PROCEDURE — 80053 COMPREHEN METABOLIC PANEL: CPT

## 2019-03-12 PROCEDURE — 85025 COMPLETE CBC W/AUTO DIFF WBC: CPT

## 2019-03-12 PROCEDURE — 84443 ASSAY THYROID STIM HORMONE: CPT

## 2019-03-12 PROCEDURE — 87086 URINE CULTURE/COLONY COUNT: CPT

## 2019-03-12 PROCEDURE — 36415 COLL VENOUS BLD VENIPUNCTURE: CPT

## 2019-03-12 PROCEDURE — 80061 LIPID PANEL: CPT

## 2019-03-13 LAB — BACTERIA UR CULT: NORMAL

## 2019-04-13 ENCOUNTER — TRANSCRIBE ORDERS (OUTPATIENT)
Dept: ADMINISTRATIVE | Facility: HOSPITAL | Age: 54
End: 2019-04-13

## 2019-04-13 ENCOUNTER — APPOINTMENT (OUTPATIENT)
Dept: LAB | Age: 54
End: 2019-04-13
Payer: COMMERCIAL

## 2019-04-13 DIAGNOSIS — L98.8 VASCULAR DISORDER OF SKIN: Primary | ICD-10-CM

## 2019-04-13 DIAGNOSIS — L98.8 VASCULAR DISORDER OF SKIN: ICD-10-CM

## 2019-04-13 LAB
ALBUMIN SERPL BCP-MCNC: 3.9 G/DL (ref 3.5–5)
ALP SERPL-CCNC: 58 U/L (ref 46–116)
ALT SERPL W P-5'-P-CCNC: 24 U/L (ref 12–78)
ANION GAP SERPL CALCULATED.3IONS-SCNC: 2 MMOL/L (ref 4–13)
AST SERPL W P-5'-P-CCNC: 16 U/L (ref 5–45)
BASOPHILS # BLD AUTO: 0.04 THOUSANDS/ΜL (ref 0–0.1)
BASOPHILS NFR BLD AUTO: 1 % (ref 0–1)
BILIRUB SERPL-MCNC: 0.26 MG/DL (ref 0.2–1)
BUN SERPL-MCNC: 13 MG/DL (ref 5–25)
CALCIUM SERPL-MCNC: 8.7 MG/DL (ref 8.3–10.1)
CHLORIDE SERPL-SCNC: 106 MMOL/L (ref 100–108)
CO2 SERPL-SCNC: 28 MMOL/L (ref 21–32)
CREAT SERPL-MCNC: 0.84 MG/DL (ref 0.6–1.3)
EOSINOPHIL # BLD AUTO: 0.08 THOUSAND/ΜL (ref 0–0.61)
EOSINOPHIL NFR BLD AUTO: 1 % (ref 0–6)
ERYTHROCYTE [DISTWIDTH] IN BLOOD BY AUTOMATED COUNT: 12.8 % (ref 11.6–15.1)
GFR SERPL CREATININE-BSD FRML MDRD: 81 ML/MIN/1.73SQ M
GLUCOSE P FAST SERPL-MCNC: 94 MG/DL (ref 65–99)
HCT VFR BLD AUTO: 42.3 % (ref 34.8–46.1)
HGB BLD-MCNC: 13.5 G/DL (ref 11.5–15.4)
IMM GRANULOCYTES # BLD AUTO: 0.04 THOUSAND/UL (ref 0–0.2)
IMM GRANULOCYTES NFR BLD AUTO: 1 % (ref 0–2)
LYMPHOCYTES # BLD AUTO: 2.64 THOUSANDS/ΜL (ref 0.6–4.47)
LYMPHOCYTES NFR BLD AUTO: 39 % (ref 14–44)
MCH RBC QN AUTO: 31.2 PG (ref 26.8–34.3)
MCHC RBC AUTO-ENTMCNC: 31.9 G/DL (ref 31.4–37.4)
MCV RBC AUTO: 98 FL (ref 82–98)
MONOCYTES # BLD AUTO: 0.42 THOUSAND/ΜL (ref 0.17–1.22)
MONOCYTES NFR BLD AUTO: 6 % (ref 4–12)
NEUTROPHILS # BLD AUTO: 3.58 THOUSANDS/ΜL (ref 1.85–7.62)
NEUTS SEG NFR BLD AUTO: 52 % (ref 43–75)
NRBC BLD AUTO-RTO: 0 /100 WBCS
PLATELET # BLD AUTO: 244 THOUSANDS/UL (ref 149–390)
PMV BLD AUTO: 11.5 FL (ref 8.9–12.7)
POTASSIUM SERPL-SCNC: 4.7 MMOL/L (ref 3.5–5.3)
PROT SERPL-MCNC: 7.3 G/DL (ref 6.4–8.2)
RBC # BLD AUTO: 4.33 MILLION/UL (ref 3.81–5.12)
SODIUM SERPL-SCNC: 136 MMOL/L (ref 136–145)
WBC # BLD AUTO: 6.8 THOUSAND/UL (ref 4.31–10.16)

## 2019-04-13 PROCEDURE — 85025 COMPLETE CBC W/AUTO DIFF WBC: CPT

## 2019-04-13 PROCEDURE — 80053 COMPREHEN METABOLIC PANEL: CPT

## 2019-04-13 PROCEDURE — 36415 COLL VENOUS BLD VENIPUNCTURE: CPT

## 2019-04-14 ENCOUNTER — APPOINTMENT (OUTPATIENT)
Dept: RADIOLOGY | Age: 54
End: 2019-04-14
Payer: COMMERCIAL

## 2019-04-14 ENCOUNTER — OFFICE VISIT (OUTPATIENT)
Dept: URGENT CARE | Age: 54
End: 2019-04-14
Payer: COMMERCIAL

## 2019-04-14 VITALS
DIASTOLIC BLOOD PRESSURE: 83 MMHG | RESPIRATION RATE: 18 BRPM | HEART RATE: 74 BPM | TEMPERATURE: 99.4 F | SYSTOLIC BLOOD PRESSURE: 161 MMHG | OXYGEN SATURATION: 100 % | WEIGHT: 177 LBS | BODY MASS INDEX: 30.38 KG/M2

## 2019-04-14 DIAGNOSIS — M25.552 LEFT HIP PAIN: Primary | ICD-10-CM

## 2019-04-14 DIAGNOSIS — M25.552 LEFT HIP PAIN: ICD-10-CM

## 2019-04-14 PROCEDURE — 99213 OFFICE O/P EST LOW 20 MIN: CPT | Performed by: NURSE PRACTITIONER

## 2019-04-14 PROCEDURE — 73502 X-RAY EXAM HIP UNI 2-3 VIEWS: CPT

## 2019-04-14 RX ORDER — PREDNISONE 50 MG/1
50 TABLET ORAL DAILY
Qty: 5 TABLET | Refills: 0 | Status: SHIPPED | OUTPATIENT
Start: 2019-04-14 | End: 2019-04-19

## 2019-04-14 RX ORDER — CYCLOBENZAPRINE HCL 10 MG
10 TABLET ORAL 3 TIMES DAILY PRN
Qty: 30 TABLET | Refills: 0 | Status: SHIPPED | OUTPATIENT
Start: 2019-04-14 | End: 2019-11-01

## 2019-04-14 RX ORDER — HYDROXYCHLOROQUINE SULFATE 200 MG/1
200 TABLET, FILM COATED ORAL 2 TIMES DAILY
Refills: 0 | COMMUNITY
Start: 2019-03-19

## 2019-04-14 RX ORDER — METOPROLOL SUCCINATE 50 MG/1
50 TABLET, EXTENDED RELEASE ORAL DAILY
Refills: 3 | COMMUNITY
Start: 2019-03-05 | End: 2020-05-07

## 2019-04-17 ENCOUNTER — APPOINTMENT (OUTPATIENT)
Dept: RADIOLOGY | Facility: CLINIC | Age: 54
End: 2019-04-17
Payer: COMMERCIAL

## 2019-04-17 ENCOUNTER — OFFICE VISIT (OUTPATIENT)
Dept: OBGYN CLINIC | Facility: MEDICAL CENTER | Age: 54
End: 2019-04-17
Payer: COMMERCIAL

## 2019-04-17 VITALS
WEIGHT: 176.4 LBS | SYSTOLIC BLOOD PRESSURE: 130 MMHG | HEART RATE: 61 BPM | DIASTOLIC BLOOD PRESSURE: 82 MMHG | BODY MASS INDEX: 31.25 KG/M2 | HEIGHT: 63 IN

## 2019-04-17 DIAGNOSIS — M25.552 PAIN IN LEFT HIP: ICD-10-CM

## 2019-04-17 DIAGNOSIS — M54.50 LUMBAR PAIN: ICD-10-CM

## 2019-04-17 DIAGNOSIS — M54.50 LUMBAR PAIN: Primary | ICD-10-CM

## 2019-04-17 PROCEDURE — 99204 OFFICE O/P NEW MOD 45 MIN: CPT | Performed by: ORTHOPAEDIC SURGERY

## 2019-04-17 PROCEDURE — 73502 X-RAY EXAM HIP UNI 2-3 VIEWS: CPT

## 2019-04-17 PROCEDURE — 72110 X-RAY EXAM L-2 SPINE 4/>VWS: CPT

## 2019-04-19 ENCOUNTER — TELEPHONE (OUTPATIENT)
Dept: OBGYN CLINIC | Facility: HOSPITAL | Age: 54
End: 2019-04-19

## 2019-05-10 ENCOUNTER — OFFICE VISIT (OUTPATIENT)
Dept: RHEUMATOLOGY | Facility: CLINIC | Age: 54
End: 2019-05-10
Payer: COMMERCIAL

## 2019-05-10 VITALS
HEIGHT: 63 IN | DIASTOLIC BLOOD PRESSURE: 76 MMHG | WEIGHT: 176 LBS | HEART RATE: 64 BPM | BODY MASS INDEX: 31.18 KG/M2 | SYSTOLIC BLOOD PRESSURE: 118 MMHG

## 2019-05-10 DIAGNOSIS — M54.32 LEFT SCIATIC NERVE PAIN: ICD-10-CM

## 2019-05-10 DIAGNOSIS — M70.72 ISCHIAL BURSITIS, LEFT: ICD-10-CM

## 2019-05-10 DIAGNOSIS — M70.62 GREATER TROCHANTERIC BURSITIS OF LEFT HIP: Primary | ICD-10-CM

## 2019-05-10 PROCEDURE — 20610 DRAIN/INJ JOINT/BURSA W/O US: CPT | Performed by: INTERNAL MEDICINE

## 2019-05-10 PROCEDURE — 99214 OFFICE O/P EST MOD 30 MIN: CPT | Performed by: INTERNAL MEDICINE

## 2019-05-10 RX ORDER — GABAPENTIN 100 MG/1
CAPSULE ORAL
Qty: 90 CAPSULE | Refills: 1 | Status: SHIPPED | OUTPATIENT
Start: 2019-05-10 | End: 2019-07-05

## 2019-05-10 RX ORDER — TRIAMCINOLONE ACETONIDE 40 MG/ML
40 INJECTION, SUSPENSION INTRA-ARTICULAR; INTRAMUSCULAR ONCE
Status: COMPLETED | OUTPATIENT
Start: 2019-05-10 | End: 2019-05-10

## 2019-05-10 RX ORDER — BUPIVACAINE HYDROCHLORIDE 2.5 MG/ML
2 INJECTION, SOLUTION INFILTRATION; PERINEURAL ONCE
Status: COMPLETED | OUTPATIENT
Start: 2019-05-10 | End: 2019-05-10

## 2019-05-10 RX ADMIN — TRIAMCINOLONE ACETONIDE 40 MG: 40 INJECTION, SUSPENSION INTRA-ARTICULAR; INTRAMUSCULAR at 12:43

## 2019-05-10 RX ADMIN — BUPIVACAINE HYDROCHLORIDE 2 ML: 2.5 INJECTION, SOLUTION INFILTRATION; PERINEURAL at 12:44

## 2019-05-17 DIAGNOSIS — M54.50 LUMBAR PAIN: Primary | ICD-10-CM

## 2019-05-17 RX ORDER — ERGOCALCIFEROL 1.25 MG/1
CAPSULE ORAL
Qty: 4 CAPSULE | Refills: 3 | Status: SHIPPED | OUTPATIENT
Start: 2019-05-17 | End: 2019-09-12 | Stop reason: SDUPTHER

## 2019-05-20 ENCOUNTER — HOSPITAL ENCOUNTER (OUTPATIENT)
Dept: RADIOLOGY | Facility: HOSPITAL | Age: 54
Discharge: HOME | End: 2019-05-20
Admitting: RADIOLOGY
Payer: COMMERCIAL

## 2019-05-20 ENCOUNTER — HOSPITAL ENCOUNTER (OUTPATIENT)
Dept: RADIOLOGY | Facility: HOSPITAL | Age: 54
Discharge: HOME | End: 2019-05-20
Payer: COMMERCIAL

## 2019-05-20 ENCOUNTER — TELEPHONE (OUTPATIENT)
Dept: OBGYN CLINIC | Facility: HOSPITAL | Age: 54
End: 2019-05-20

## 2019-05-20 DIAGNOSIS — M70.72 ISCHIAL BURSITIS, LEFT: ICD-10-CM

## 2019-05-20 DIAGNOSIS — M70.72 ISCHIAL BURSITIS, LEFT: Primary | ICD-10-CM

## 2019-05-20 PROCEDURE — 20610 DRAIN/INJ JOINT/BURSA W/O US: CPT

## 2019-05-20 PROCEDURE — 77012 CT SCAN FOR NEEDLE BIOPSY: CPT

## 2019-05-20 RX ORDER — LIDOCAINE HYDROCHLORIDE 10 MG/ML
11 INJECTION, SOLUTION EPIDURAL; INFILTRATION; INTRACAUDAL; PERINEURAL ONCE
Status: COMPLETED | OUTPATIENT
Start: 2019-05-20 | End: 2019-05-20

## 2019-05-20 RX ORDER — METHYLPREDNISOLONE ACETATE 80 MG/ML
80 INJECTION, SUSPENSION INTRA-ARTICULAR; INTRALESIONAL; INTRAMUSCULAR; SOFT TISSUE ONCE
Status: COMPLETED | OUTPATIENT
Start: 2019-05-20 | End: 2019-05-20

## 2019-05-20 RX ADMIN — LIDOCAINE HYDROCHLORIDE 11 ML: 10 INJECTION, SOLUTION EPIDURAL; INFILTRATION; INTRACAUDAL; PERINEURAL at 15:58

## 2019-05-20 RX ADMIN — METHYLPREDNISOLONE ACETATE 80 MG: 80 INJECTION, SUSPENSION INTRA-ARTICULAR; INTRALESIONAL; INTRAMUSCULAR; SOFT TISSUE at 15:58

## 2019-05-24 ENCOUNTER — APPOINTMENT (OUTPATIENT)
Dept: LAB | Facility: HOSPITAL | Age: 54
End: 2019-05-24
Payer: COMMERCIAL

## 2019-05-24 ENCOUNTER — TELEPHONE (OUTPATIENT)
Dept: OBGYN CLINIC | Facility: HOSPITAL | Age: 54
End: 2019-05-24

## 2019-05-24 ENCOUNTER — TRANSCRIBE ORDERS (OUTPATIENT)
Dept: ADMINISTRATIVE | Facility: HOSPITAL | Age: 54
End: 2019-05-24

## 2019-05-24 DIAGNOSIS — Z79.899 ENCOUNTER FOR LONG-TERM (CURRENT) USE OF OTHER MEDICATIONS: ICD-10-CM

## 2019-05-24 DIAGNOSIS — M54.16 LEFT LUMBAR RADICULOPATHY: ICD-10-CM

## 2019-05-24 DIAGNOSIS — G54.1 LUMBOSACRAL PLEXOPATHY: ICD-10-CM

## 2019-05-24 DIAGNOSIS — M54.16 LUMBAR RADICULOPATHY: Primary | ICD-10-CM

## 2019-05-24 DIAGNOSIS — L98.8 VASCULAR DISORDER OF SKIN: ICD-10-CM

## 2019-05-24 DIAGNOSIS — L98.8 VASCULAR DISORDER OF SKIN: Primary | ICD-10-CM

## 2019-05-24 LAB
ALBUMIN SERPL BCP-MCNC: 4.2 G/DL (ref 3–5.2)
ALP SERPL-CCNC: 36 U/L (ref 43–122)
ALT SERPL W P-5'-P-CCNC: 31 U/L (ref 9–52)
ANION GAP SERPL CALCULATED.3IONS-SCNC: 8 MMOL/L (ref 5–14)
AST SERPL W P-5'-P-CCNC: 37 U/L (ref 14–36)
BASOPHILS # BLD AUTO: 0 THOUSANDS/ΜL (ref 0–0.1)
BASOPHILS NFR BLD AUTO: 0 % (ref 0–1)
BILIRUB SERPL-MCNC: 0.6 MG/DL
BUN SERPL-MCNC: 13 MG/DL (ref 5–25)
CALCIUM SERPL-MCNC: 9.2 MG/DL (ref 8.4–10.2)
CHLORIDE SERPL-SCNC: 102 MMOL/L (ref 97–108)
CO2 SERPL-SCNC: 27 MMOL/L (ref 22–30)
CREAT SERPL-MCNC: 0.62 MG/DL (ref 0.6–1.2)
EOSINOPHIL # BLD AUTO: 0 THOUSAND/ΜL (ref 0–0.4)
EOSINOPHIL NFR BLD AUTO: 1 % (ref 0–6)
ERYTHROCYTE [DISTWIDTH] IN BLOOD BY AUTOMATED COUNT: 13.5 %
GFR SERPL CREATININE-BSD FRML MDRD: 105 ML/MIN/1.73SQ M
GLUCOSE P FAST SERPL-MCNC: 87 MG/DL (ref 70–99)
HCT VFR BLD AUTO: 41.5 % (ref 36–46)
HGB BLD-MCNC: 13.8 G/DL (ref 12–16)
LYMPHOCYTES # BLD AUTO: 2.5 THOUSANDS/ΜL (ref 0.5–4)
LYMPHOCYTES NFR BLD AUTO: 37 % (ref 25–45)
MCH RBC QN AUTO: 31 PG (ref 26–34)
MCHC RBC AUTO-ENTMCNC: 33.1 G/DL (ref 31–36)
MCV RBC AUTO: 94 FL (ref 80–100)
MONOCYTES # BLD AUTO: 0.4 THOUSAND/ΜL (ref 0.2–0.9)
MONOCYTES NFR BLD AUTO: 6 % (ref 1–10)
NEUTROPHILS # BLD AUTO: 3.8 THOUSANDS/ΜL (ref 1.8–7.8)
NEUTS SEG NFR BLD AUTO: 56 % (ref 45–65)
PLATELET # BLD AUTO: 255 THOUSANDS/UL (ref 150–450)
PMV BLD AUTO: 9.3 FL (ref 8.9–12.7)
POTASSIUM SERPL-SCNC: 4.7 MMOL/L (ref 3.6–5)
PROT SERPL-MCNC: 7.1 G/DL (ref 5.9–8.4)
RBC # BLD AUTO: 4.43 MILLION/UL (ref 4–5.2)
SODIUM SERPL-SCNC: 137 MMOL/L (ref 137–147)
WBC # BLD AUTO: 6.8 THOUSAND/UL (ref 4.5–11)

## 2019-05-24 PROCEDURE — 80053 COMPREHEN METABOLIC PANEL: CPT

## 2019-05-24 PROCEDURE — 36415 COLL VENOUS BLD VENIPUNCTURE: CPT

## 2019-05-24 PROCEDURE — 85025 COMPLETE CBC W/AUTO DIFF WBC: CPT

## 2019-05-30 ENCOUNTER — TELEPHONE (OUTPATIENT)
Dept: RHEUMATOLOGY | Facility: CLINIC | Age: 54
End: 2019-05-30

## 2019-06-10 ENCOUNTER — TELEPHONE (OUTPATIENT)
Dept: RHEUMATOLOGY | Facility: CLINIC | Age: 54
End: 2019-06-10

## 2019-06-10 DIAGNOSIS — M70.72 ISCHIAL BURSITIS, LEFT: Primary | ICD-10-CM

## 2019-06-10 DIAGNOSIS — M54.32 LEFT SCIATIC NERVE PAIN: ICD-10-CM

## 2019-06-11 ENCOUNTER — HOSPITAL ENCOUNTER (OUTPATIENT)
Dept: MRI IMAGING | Facility: HOSPITAL | Age: 54
Discharge: HOME | End: 2019-06-11
Payer: COMMERCIAL

## 2019-06-11 ENCOUNTER — TELEPHONE (OUTPATIENT)
Dept: OBGYN CLINIC | Facility: HOSPITAL | Age: 54
End: 2019-06-11

## 2019-06-11 ENCOUNTER — TELEPHONE (OUTPATIENT)
Dept: RHEUMATOLOGY | Facility: CLINIC | Age: 54
End: 2019-06-11

## 2019-06-11 DIAGNOSIS — M54.16 LEFT LUMBAR RADICULOPATHY: ICD-10-CM

## 2019-06-11 DIAGNOSIS — N83.202 CYST OF LEFT OVARY: Primary | ICD-10-CM

## 2019-06-11 DIAGNOSIS — G54.1 LUMBOSACRAL PLEXOPATHY: ICD-10-CM

## 2019-06-11 DIAGNOSIS — M54.16 LUMBAR RADICULOPATHY: ICD-10-CM

## 2019-06-11 PROCEDURE — 72195 MRI PELVIS W/O DYE: CPT

## 2019-06-13 ENCOUNTER — TELEPHONE (OUTPATIENT)
Dept: OBGYN CLINIC | Facility: HOSPITAL | Age: 54
End: 2019-06-13

## 2019-06-13 ENCOUNTER — HOSPITAL ENCOUNTER (OUTPATIENT)
Dept: ULTRASOUND IMAGING | Facility: HOSPITAL | Age: 54
Discharge: HOME | End: 2019-06-13
Payer: COMMERCIAL

## 2019-06-13 DIAGNOSIS — N83.202 CYST OF LEFT OVARY: ICD-10-CM

## 2019-06-13 PROCEDURE — 76856 US EXAM PELVIC COMPLETE: CPT

## 2019-06-18 ENCOUNTER — TELEPHONE (OUTPATIENT)
Dept: RHEUMATOLOGY | Facility: CLINIC | Age: 54
End: 2019-06-18

## 2019-06-21 ENCOUNTER — EVALUATION (OUTPATIENT)
Dept: PHYSICAL THERAPY | Facility: CLINIC | Age: 54
End: 2019-06-21
Payer: COMMERCIAL

## 2019-06-21 DIAGNOSIS — M70.72 ISCHIAL BURSITIS, LEFT: ICD-10-CM

## 2019-06-21 DIAGNOSIS — M54.31 BILATERAL SCIATICA: Primary | ICD-10-CM

## 2019-06-21 DIAGNOSIS — M54.32 BILATERAL SCIATICA: Primary | ICD-10-CM

## 2019-06-21 DIAGNOSIS — M54.32 LEFT SCIATIC NERVE PAIN: ICD-10-CM

## 2019-06-21 PROCEDURE — 97140 MANUAL THERAPY 1/> REGIONS: CPT | Performed by: PHYSICAL THERAPIST

## 2019-06-21 PROCEDURE — 97162 PT EVAL MOD COMPLEX 30 MIN: CPT | Performed by: PHYSICAL THERAPIST

## 2019-06-21 RX ORDER — NAPROXEN 500 MG/1
500 TABLET ORAL 2 TIMES DAILY WITH MEALS
COMMUNITY
End: 2019-12-03

## 2019-06-22 ENCOUNTER — APPOINTMENT (OUTPATIENT)
Dept: LAB | Facility: HOSPITAL | Age: 54
End: 2019-06-22
Payer: COMMERCIAL

## 2019-06-22 DIAGNOSIS — L98.8 VASCULAR DISORDER OF SKIN: ICD-10-CM

## 2019-06-22 DIAGNOSIS — Z79.899 ENCOUNTER FOR LONG-TERM (CURRENT) USE OF OTHER MEDICATIONS: ICD-10-CM

## 2019-06-22 LAB
ALBUMIN SERPL BCP-MCNC: 3.9 G/DL (ref 3–5.2)
ALP SERPL-CCNC: 41 U/L (ref 43–122)
ALT SERPL W P-5'-P-CCNC: 22 U/L (ref 9–52)
ANION GAP SERPL CALCULATED.3IONS-SCNC: 6 MMOL/L (ref 5–14)
AST SERPL W P-5'-P-CCNC: 25 U/L (ref 14–36)
BASOPHILS # BLD AUTO: 0 THOUSANDS/ΜL (ref 0–0.1)
BASOPHILS NFR BLD AUTO: 1 % (ref 0–1)
BILIRUB SERPL-MCNC: 0.3 MG/DL
BUN SERPL-MCNC: 12 MG/DL (ref 5–25)
CALCIUM SERPL-MCNC: 8.8 MG/DL (ref 8.4–10.2)
CHLORIDE SERPL-SCNC: 106 MMOL/L (ref 97–108)
CO2 SERPL-SCNC: 26 MMOL/L (ref 22–30)
CREAT SERPL-MCNC: 0.63 MG/DL (ref 0.6–1.2)
EOSINOPHIL # BLD AUTO: 0.1 THOUSAND/ΜL (ref 0–0.4)
EOSINOPHIL NFR BLD AUTO: 1 % (ref 0–6)
ERYTHROCYTE [DISTWIDTH] IN BLOOD BY AUTOMATED COUNT: 13.8 %
GFR SERPL CREATININE-BSD FRML MDRD: 104 ML/MIN/1.73SQ M
GLUCOSE P FAST SERPL-MCNC: 93 MG/DL (ref 70–99)
HCT VFR BLD AUTO: 40.8 % (ref 36–46)
HGB BLD-MCNC: 13.8 G/DL (ref 12–16)
LYMPHOCYTES # BLD AUTO: 2.7 THOUSANDS/ΜL (ref 0.5–4)
LYMPHOCYTES NFR BLD AUTO: 42 % (ref 25–45)
MCH RBC QN AUTO: 32.1 PG (ref 26–34)
MCHC RBC AUTO-ENTMCNC: 34 G/DL (ref 31–36)
MCV RBC AUTO: 95 FL (ref 80–100)
MONOCYTES # BLD AUTO: 0.4 THOUSAND/ΜL (ref 0.2–0.9)
MONOCYTES NFR BLD AUTO: 7 % (ref 1–10)
NEUTROPHILS # BLD AUTO: 3.1 THOUSANDS/ΜL (ref 1.8–7.8)
NEUTS SEG NFR BLD AUTO: 49 % (ref 45–65)
PLATELET # BLD AUTO: 253 THOUSANDS/UL (ref 150–450)
PMV BLD AUTO: 9.4 FL (ref 8.9–12.7)
POTASSIUM SERPL-SCNC: 4.8 MMOL/L (ref 3.6–5)
PROT SERPL-MCNC: 6.8 G/DL (ref 5.9–8.4)
RBC # BLD AUTO: 4.31 MILLION/UL (ref 4–5.2)
SODIUM SERPL-SCNC: 138 MMOL/L (ref 137–147)
WBC # BLD AUTO: 6.3 THOUSAND/UL (ref 4.5–11)

## 2019-06-22 PROCEDURE — 85025 COMPLETE CBC W/AUTO DIFF WBC: CPT

## 2019-06-22 PROCEDURE — 80053 COMPREHEN METABOLIC PANEL: CPT

## 2019-06-22 PROCEDURE — 36415 COLL VENOUS BLD VENIPUNCTURE: CPT

## 2019-06-26 ENCOUNTER — OFFICE VISIT (OUTPATIENT)
Dept: PHYSICAL THERAPY | Facility: CLINIC | Age: 54
End: 2019-06-26
Payer: COMMERCIAL

## 2019-06-26 DIAGNOSIS — M54.32 LEFT SCIATIC NERVE PAIN: ICD-10-CM

## 2019-06-26 DIAGNOSIS — M54.31 BILATERAL SCIATICA: Primary | ICD-10-CM

## 2019-06-26 DIAGNOSIS — M54.32 BILATERAL SCIATICA: Primary | ICD-10-CM

## 2019-06-26 DIAGNOSIS — M70.72 ISCHIAL BURSITIS, LEFT: ICD-10-CM

## 2019-06-26 PROCEDURE — 97140 MANUAL THERAPY 1/> REGIONS: CPT

## 2019-06-26 PROCEDURE — 97110 THERAPEUTIC EXERCISES: CPT

## 2019-06-28 ENCOUNTER — OFFICE VISIT (OUTPATIENT)
Dept: PHYSICAL THERAPY | Facility: CLINIC | Age: 54
End: 2019-06-28
Payer: COMMERCIAL

## 2019-06-28 DIAGNOSIS — M54.31 BILATERAL SCIATICA: Primary | ICD-10-CM

## 2019-06-28 DIAGNOSIS — M54.32 BILATERAL SCIATICA: Primary | ICD-10-CM

## 2019-06-28 DIAGNOSIS — M54.32 LEFT SCIATIC NERVE PAIN: ICD-10-CM

## 2019-06-28 DIAGNOSIS — M70.72 ISCHIAL BURSITIS, LEFT: ICD-10-CM

## 2019-06-28 PROCEDURE — 97110 THERAPEUTIC EXERCISES: CPT | Performed by: PHYSICAL THERAPIST

## 2019-06-28 PROCEDURE — 97140 MANUAL THERAPY 1/> REGIONS: CPT | Performed by: PHYSICAL THERAPIST

## 2019-07-01 ENCOUNTER — OFFICE VISIT (OUTPATIENT)
Dept: PHYSICAL THERAPY | Facility: CLINIC | Age: 54
End: 2019-07-01
Payer: COMMERCIAL

## 2019-07-01 DIAGNOSIS — M54.32 BILATERAL SCIATICA: Primary | ICD-10-CM

## 2019-07-01 DIAGNOSIS — M54.32 LEFT SCIATIC NERVE PAIN: ICD-10-CM

## 2019-07-01 DIAGNOSIS — M54.31 BILATERAL SCIATICA: Primary | ICD-10-CM

## 2019-07-01 DIAGNOSIS — M70.72 ISCHIAL BURSITIS, LEFT: ICD-10-CM

## 2019-07-01 PROCEDURE — 97140 MANUAL THERAPY 1/> REGIONS: CPT

## 2019-07-01 PROCEDURE — 97110 THERAPEUTIC EXERCISES: CPT

## 2019-07-01 NOTE — PROGRESS NOTES
Daily Note     Today's date: 2019  Patient name: Mike Booker  : 1965  MRN: [id number]  Referring provider: Agnes Gomez DO  Dx:   Encounter Diagnosis     ICD-10-CM    1  Bilateral sciatica M54 31     M54 32    2  Ischial bursitis, left M70 72    3  Left sciatic nerve pain M54 32                   Subjective: Pt reports decreased pain since change in POC last visit  She reported numbness and burning in lateral thigh through calf with discomfort in lateral hip as well  Objective: See treatment diary below    Daily Treatment Diary   Precautions: HTN, h/o Lupus    Date          FOTO IE             Re-eval IE              Daily Treatment Diary     Manual           PA mobs L4-5             Manual lumbar traction  15' WILMER  x10'              Exercise Diary           Treadmill  5 min 5 min 5 min 2 0 mph                      Stand RM lumb ext             Stand RM side glide - right  3x10  np         Stand hip ext                          Prone on elbows  4' 1' np         Prone press ups  4x10 1x10 np         Prone hip ext             Cat/Cow                          DKTC   20"x5 20"x5         LTR   20"5 ea 15"x5         Child's pose   20"x5 20"x5         Seated hamstring stretch    30"x3         pball rollouts    15"x5 fwd                                                                 Modalities                                                       Assessment: Tolerated treatment well  She exhibits improvement of LE's sx's with exercises, but continues to experience slow reproduction of numbness and burnign sensation in lateral L LE  Positive response with manual traction with appropriate stretch response, however continued with reproduction of numbness shortly after standing  Patient demonstrated fatigue post treatment, exhibited good technique with therapeutic exercises and would benefit from continued PT   Issued/reviewed updated HEP with flexion bias exercises with no questions or concerns expressed  Plan: Continue per plan of care

## 2019-07-03 ENCOUNTER — OFFICE VISIT (OUTPATIENT)
Dept: PHYSICAL THERAPY | Facility: CLINIC | Age: 54
End: 2019-07-03
Payer: COMMERCIAL

## 2019-07-03 DIAGNOSIS — M70.72 ISCHIAL BURSITIS, LEFT: ICD-10-CM

## 2019-07-03 DIAGNOSIS — M54.32 BILATERAL SCIATICA: Primary | ICD-10-CM

## 2019-07-03 DIAGNOSIS — M54.31 BILATERAL SCIATICA: Primary | ICD-10-CM

## 2019-07-03 DIAGNOSIS — M54.32 LEFT SCIATIC NERVE PAIN: ICD-10-CM

## 2019-07-03 PROCEDURE — 97140 MANUAL THERAPY 1/> REGIONS: CPT

## 2019-07-03 PROCEDURE — 97110 THERAPEUTIC EXERCISES: CPT

## 2019-07-03 NOTE — PROGRESS NOTES
Daily Note     Today's date: 7/3/2019  Patient name: Juliet Rubalcava  : 1965  MRN: [id number]  Referring provider: Bella Baker DO  Dx:   Encounter Diagnosis     ICD-10-CM    1  Bilateral sciatica M54 31     M54 32    2  Ischial bursitis, left M70 72    3  Left sciatic nerve pain M54 32                   Subjective: Pt reports continued numbness and burning in lateral thigh through calf with discomfort in lateral hip as well  She noted compliance with updated HEP since last visit  Objective: See treatment diary below    Daily Treatment Diary   Precautions: HTN, h/o Lupus    Date 6/21 6/26 6/28 7/1 7/3        FOTO IE     41        Re-eval IE              Daily Treatment Diary     Manual  6/21 6/26 6/28 7/1 7/3        PA mobs L4-5             Manual lumbar traction  15' WILMER  x10' x10' TE  x10'            Exercise Diary  6/21 6/26 6/28 7/1 7/3        Treadmill  5 min 5 min 5 min 2 0 mph 5 min 2 0 mph                     Stand RM lumb ext             Stand RM side glide - right  3x10  np         Stand hip ext                          Prone on elbows  4' 1' np         Prone press ups  4x10 1x10 np         Prone hip ext             Cat/Cow                          DKTC   20"x5 20"x5 20"x5        LTR   20"5 ea 15"x5 15"x5        Child's pose   20"x5 20"x5 20"x5        Seated hamstring stretch    30"x3 30"x3        pball rollouts    15"x5 fwd 15"x5 fwd                                                                Modalities  6/21 6/25 6/28 7/1 7/3                                                    Assessment: Tolerated treatment well  She exhibits improvement of LE's sx's with exercises, but continues to experience slow reproduction of numbness and burnign sensation in lateral L LE  Positive response with manual traction with appropriate stretch response, however continued with reproduction of numbness shortly after standing   Patient demonstrated fatigue post treatment, exhibited good technique with therapeutic exercises and would benefit from continued PT  Plan: Continue per plan of care

## 2019-07-05 ENCOUNTER — CLINICAL SUPPORT (OUTPATIENT)
Dept: PAIN MEDICINE | Facility: CLINIC | Age: 54
End: 2019-07-05
Payer: COMMERCIAL

## 2019-07-05 VITALS
TEMPERATURE: 98.1 F | WEIGHT: 166.8 LBS | SYSTOLIC BLOOD PRESSURE: 121 MMHG | HEART RATE: 67 BPM | DIASTOLIC BLOOD PRESSURE: 89 MMHG | HEIGHT: 63 IN | BODY MASS INDEX: 29.55 KG/M2

## 2019-07-05 DIAGNOSIS — M54.16 LUMBAR RADICULOPATHY: Primary | ICD-10-CM

## 2019-07-05 DIAGNOSIS — M70.62 TROCHANTERIC BURSITIS OF LEFT HIP: ICD-10-CM

## 2019-07-05 DIAGNOSIS — M70.72 ISCHIAL BURSITIS, LEFT: ICD-10-CM

## 2019-07-05 PROCEDURE — 99204 OFFICE O/P NEW MOD 45 MIN: CPT | Performed by: ANESTHESIOLOGY

## 2019-07-05 RX ORDER — GABAPENTIN 300 MG/1
300 CAPSULE ORAL 3 TIMES DAILY
Qty: 90 CAPSULE | Refills: 1 | Status: SHIPPED | OUTPATIENT
Start: 2019-07-05 | End: 2019-08-22 | Stop reason: SDUPTHER

## 2019-07-05 NOTE — PROGRESS NOTES
Assessment  1  Lumbar radiculopathy    2  Ischial bursitis, left    3  Trochanteric bursitis of left hip        Plan  56-year-old female presenting for initial consultation regarding a 3 month history of lumbosacral back pain with radiculopathy in the L5 distribution of the left lower extremity  X-ray of the lumbar spine was unremarkable  MRI of the pelvis and sacroiliac joints did show some degenerative changes of the sacroiliac joints  MRI of the lumbar spine was ordered but denied because she did not complete any physical therapy within the past 6 months  She is currently involved in physical therapy and has completed approximately 5 sessions with minimal improvement  The patient is currently taking naproxen p r n  And cyclobenzaprine p r n  Without much relief  She is also taking gabapentin 100 mg t i d  With some improvement  She did not respond to a course of oral steroids  1  The patient will continue with physical therapy as I feel she would benefit from [place] based exercises  2  If the patient does not respond to 6 weeks of PT will order an MRI of the lumbar spine  3  I will increase her gabapentin to 300 mg t i d  And a titration schedule at today's visit  4  The patient may continue with naproxen and cyclobenzaprine as ordered   5  I will follow up the patient in 4 weeks      My impressions and treatment recommendations were discussed in detail with the patient who verbalized understanding and had no further questions  Discharge instructions were provided  I personally saw and examined the patient and I agree with the above discussed plan of care  No orders of the defined types were placed in this encounter  No orders of the defined types were placed in this encounter        History of Present Illness    Becky Heller is a 48 y o  female presenting for initial consultation regarding low back pain that radiates into the posterolateral aspect of the left lower extremity to the ankle with associated numbness and subjective weakness  She denies any right lower extremity symptoms, bladder bowel incontinence, or saddle anesthesia  The pain began when she was doing some exercises in the gym  She denies any trauma to her low back  X-ray of the lumbar spine was unremarkable  MRI of the pelvis and sacroiliac joints did show some degenerative changes of the sacroiliac joints  MRI of the lumbar spine was ordered but denied because she did not complete any physical therapy within the past 6 months  She is currently involved in physical therapy and has completed approximately 5 sessions with minimal improvement  The patient is currently taking naproxen p r n  And cyclobenzaprine p r n  Without much relief  She is also taking gabapentin 100 mg t i d  With some improvement  She did not respond to a course of oral steroids  The patient rates her pain a 7/10 and the pain is nearly constant  The pain does not follow any particular pattern throughout the day  The pain is described as burning, cramping, numbness, sharp, and pins and needles  The pain is decreased with lying down, walking, and relaxation  The pain is increased with standing, bending, and exercise  I have personally reviewed and/or updated the patient's past medical history, past surgical history, family history, social history, current medications, allergies, and vital signs today  Other than as stated above, the patient denies any interval changes in medications, medical condition, mental condition, symptoms, or allergies since the last office visit  Review of Systems   Constitutional: Negative for fever and unexpected weight change  HENT: Negative for trouble swallowing  Eyes: Negative for visual disturbance  Respiratory: Negative for shortness of breath and wheezing  Cardiovascular: Negative for chest pain and palpitations  Gastrointestinal: Negative for constipation, diarrhea, nausea and vomiting  Endocrine: Negative for cold intolerance, heat intolerance and polydipsia  Genitourinary: Negative for difficulty urinating and frequency  Musculoskeletal: Positive for joint swelling and myalgias  Negative for arthralgias and gait problem  Skin: Negative for rash  Neurological: Positive for numbness and headaches  Negative for dizziness, seizures, syncope and weakness  Hematological: Does not bruise/bleed easily  Psychiatric/Behavioral: Negative for dysphoric mood  Anxiety   All other systems reviewed and are negative        Patient Active Problem List   Diagnosis    Migraine without aura or status migrainosus    Low vitamin D level    Insomnia    Acne       Past Medical History:   Diagnosis Date    Acne 02/06/2018    Greater trochanteric bursitis of left hip 05/10/2019    Headache 02/06/2018    Hypertension     Insomnia 02/06/2018    Ischial bursitis of left side 05/10/2019    Left-sided low back pain with left-sided sciatica 05/10/2019    Lupus erythematosus     Vitamin D deficiency        Past Surgical History:   Procedure Laterality Date    CT NEEDLE BX ASPIRATION INJECTION LOCALIZATION  5/20/2019    TONSILLECTOMY         Family History   Problem Relation Age of Onset    No Known Problems Mother     No Known Problems Father        Social History     Occupational History    Not on file   Tobacco Use    Smoking status: Never Smoker    Smokeless tobacco: Never Used   Substance and Sexual Activity    Alcohol use: No    Drug use: No    Sexual activity: Not on file       Current Outpatient Medications on File Prior to Visit   Medication Sig    cyclobenzaprine (FLEXERIL) 10 mg tablet Take 1 tablet (10 mg total) by mouth 3 (three) times a day as needed for muscle spasms    ergocalciferol (VITAMIN D2) 50,000 units TAKE ONE CAPSULE BY MOUTH ONE TIME PER WEEK    gabapentin (NEURONTIN) 100 mg capsule 1 tab at bedtime x3 days, then 1 tab BID x3 days, then 1 tab TID thereafter  hydroxychloroquine (PLAQUENIL) 200 mg tablet Take 200 mg by mouth 2 (two) times a day    metoprolol succinate (TOPROL-XL) 50 mg 24 hr tablet Take 50 mg by mouth daily    ZOLMitriptan (ZOMIG) 2 5 MG tablet TAKE 1 TABLET BY MOUTH ONCE-MAY REPEAT AFTER 2 HOURS IF NEEDED,NO MORE THAN 10 MG/24 HRS    naproxen (NAPROSYN) 500 mg tablet Take 500 mg by mouth 2 (two) times a day with meals     No current facility-administered medications on file prior to visit  Allergies   Allergen Reactions    Pylera [Bis Subcit-Metronid-Tetracyc] Nausea Only    Sulfa Antibiotics Nausea Only       Physical Exam    Ht 5' 3" (1 6 m)   Wt 75 7 kg (166 lb 12 8 oz)   BMI 29 55 kg/m²     Constitutional: normal, well developed, well nourished, alert, in no distress and non-toxic and no overt pain behavior  Eyes: anicteric  HEENT: grossly intact  Neck: supple, symmetric, trachea midline and no masses   Pulmonary:even and unlabored  Cardiovascular:No edema or pitting edema present  Skin:Normal without rashes or lesions and well hydrated  Psychiatric:Mood and affect appropriate  Neurologic:Cranial Nerves II-XII grossly intact  Musculoskeletal:antalgic gait  Bilateral lumbar paraspinals mildly tender to palpation at L4-L5  Bilateral SI joints nontender to palpation  Bilateral patellar and Achilles reflexes 2/4 and symmetrical   No clonus was noted bilaterally  Bilateral lower extremity strength 5/5 in all muscle groups  Sensation decreased to light touch in the L5 distribution of the left lower extremity  Negative straight leg raise bilaterally  Negative Clemente's test bilaterally      Imaging    PACS Images      Show images for XR hip/pelv 2-3 vws left if performed   Study Result     LEFT HIP     INDICATION:   M25 552: Pain in left hip      COMPARISON:  4/14/2019      VIEWS:  XR HIP/PELV 2-3 VWS LEFT  W PELVIS IF PERFORMED   Images: 3     FINDINGS:     There is no acute fracture or dislocation      No significant hip degenerative changes      Small density adjacent to the superior aspect of the greater trochanter may represent calcific tendinitis      Soft tissues are unremarkable      The visualized lumbar spine is unremarkable      IMPRESSION:     No acute osseous abnormality            Workstation performed: [id number]           PACS Images      Show images for XR spine lumbar minimum 4 views non injury   Study Result     LUMBAR SPINE     INDICATION:   M54 5: Low back pain      COMPARISON:  None     VIEWS:  XR SPINE LUMBAR MINIMUM 4 VIEWS NON INJURY  Images: 4     FINDINGS:     There is no evidence of acute fracture or destructive osseous lesion      Alignment is unremarkable      No significant lumbar degenerative change noted      The pedicles appear intact      Soft tissues are unremarkable      IMPRESSION:     No acute compression collapse of the vertebra seen  No evidence of degenerative disc disease        Workstation performed: [id number]         PACS Images      Show images for MRI pelvis sacrum,coccyx, si jts wo contrast   Study Result     MRI BONY PELVIS     INDICATION:   M54 16: Radiculopathy, lumbar region  M54 16: Radiculopathy, lumbar region  G54 1: Lumbosacral plexus disorders      COMPARISON:  Plain films of the hips dated 4/17/2019     TECHNIQUE:  MR sequences were obtained of the pelvis to evaluate for sacral and coccygeal pathology  Localizers, sagittal STIR or sagittal T2 fat sat, axial T1, axial T2 fat sat, coronal T1, coronal STIR were obtained  Axial and coronal images were   obliqued relative to the sacrum and coccyx          Gadolinium was not used      FINDINGS:     SUBCUTANEOUS TISSUES: Normal     SI JOINTS AND SYMPHYSIS PUBIS:   Mild sacroiliac joint degenerative osteoarthritis noted       VISUALIZED LUMBAR SPINE:  Unremarkable      BONES:  No suspicious focal lesions   No sacral or coccygeal fractures      MUSCULATURE:  Unremarkable      PELVIC SOFT TISSUES:   Left adnexal cyst measuring 3 2 x 3 9 cm   Correlate with ultrasound      IMPRESSION:  1  Mild bilateral degenerative changes noted involving the sacroiliac joints  No evidence of inflammatory arthropathy    2   Left adnexal cyst   Correlate with ultrasound      The study was marked in EPIC for significant notification         Workstation performed: [id number]

## 2019-07-08 ENCOUNTER — TELEPHONE (OUTPATIENT)
Dept: PAIN MEDICINE | Facility: CLINIC | Age: 54
End: 2019-07-08

## 2019-07-08 ENCOUNTER — OFFICE VISIT (OUTPATIENT)
Dept: PHYSICAL THERAPY | Facility: CLINIC | Age: 54
End: 2019-07-08
Payer: COMMERCIAL

## 2019-07-08 DIAGNOSIS — M70.72 ISCHIAL BURSITIS, LEFT: ICD-10-CM

## 2019-07-08 DIAGNOSIS — M54.32 BILATERAL SCIATICA: Primary | ICD-10-CM

## 2019-07-08 DIAGNOSIS — M54.32 LEFT SCIATIC NERVE PAIN: ICD-10-CM

## 2019-07-08 DIAGNOSIS — M54.31 BILATERAL SCIATICA: Primary | ICD-10-CM

## 2019-07-08 PROCEDURE — 97140 MANUAL THERAPY 1/> REGIONS: CPT

## 2019-07-08 PROCEDURE — 97110 THERAPEUTIC EXERCISES: CPT

## 2019-07-08 NOTE — PROGRESS NOTES
Daily Note     Today's date: 2019  Patient name: Ole Khan  : 1965  MRN: [id number]  Referring provider: Lonnie España DO  Dx:   Encounter Diagnosis     ICD-10-CM    1  Bilateral sciatica M54 31     M54 32    2  Ischial bursitis, left M70 72    3  Left sciatic nerve pain M54 32                   Subjective: Pt reports ntoicable improvement since beginning therapy  She noted no longer experiencing pain at rest and ability to complete ADL's with less pain  Objective: See treatment diary below    Daily Treatment Diary   Precautions: HTN, h/o Lupus    Date 6/21 6/26 6/28 7/1 7/3 7/8       FOTO IE     41        Re-eval IE              Daily Treatment Diary     Manual  6/21 6/26 6/28 7/1 7/3 7/8       PA mobs L4-5             Manual lumbar traction  15' WILMER  x10' x10' TE  x10'            Exercise Diary  6/21 6/26 6/28 7/1 7/3 7/8       Treadmill  5 min 5 min 5 min 2 0 mph 5 min 2 0 mph 5 min 2 0 mph                    Stand RM lumb ext             Stand RM side glide - right  3x10  np         Stand hip ext                          Prone on elbows  4' 1' np         Prone press ups  4x10 1x10 np         Prone hip ext             Cat/Cow                          DKTC   20"x5 20"x5 20"x5 15"x5       LTR   20"5 ea 15"x5 15"x5 15"x5       Child's pose   20"x5 20"x5 20"x5 15"x5       Seated hamstring stretch    30"x3 30"x3 30"x3       pball rollouts    15"x5 fwd 15"x5 fwd 15"x5 fwd                                                               Modalities  6/21 6/25 6/28 7/1 7/3 7/8                                                   Assessment: Tolerated treatment well  She exhibits improvement of LE's sx's with exercises, but continues to experience slow reproduction of numbness and burning sensation upon standing in lateral L LE  Continues with positive response with manual traction   Patient demonstrated fatigue post treatment, exhibited good technique with therapeutic exercises and would benefit from continued PT  Plan: Continue per plan of care

## 2019-07-08 NOTE — TELEPHONE ENCOUNTER
Faxed records to [facility] @ patients request  Faxed # given [phone]   Patient stated she will f/u with employer

## 2019-07-09 NOTE — TELEPHONE ENCOUNTER
Pt called stating that [employer] did not receive the records and asked that they be faxed again      Fax # [phone]    Claim # [id number]    Pt would like a callback once faxed:  [phone]

## 2019-07-09 NOTE — TELEPHONE ENCOUNTER
Carson Fish RN from [employer] is requesting a call back please @ [phone]    She want's to know what are the patient's exam finding? Why is she not able to work? Can she phyiscal do her job? What is her functional impermeant? If she does not answer please leave her a voicemail, it is a private line  Thank you

## 2019-07-09 NOTE — TELEPHONE ENCOUNTER
The patient's physical exam findings are in the physical exam portion of our office notes  I did not provide any work restrictions are complete any functional capacity evaluations, FMLA, or disability paperwork    If this is what is required, the patient will need to be seen by physiatrist

## 2019-07-11 ENCOUNTER — OFFICE VISIT (OUTPATIENT)
Dept: PHYSICAL THERAPY | Facility: CLINIC | Age: 54
End: 2019-07-11
Payer: COMMERCIAL

## 2019-07-11 DIAGNOSIS — M70.72 ISCHIAL BURSITIS, LEFT: ICD-10-CM

## 2019-07-11 DIAGNOSIS — M54.32 BILATERAL SCIATICA: Primary | ICD-10-CM

## 2019-07-11 DIAGNOSIS — M54.31 BILATERAL SCIATICA: Primary | ICD-10-CM

## 2019-07-11 DIAGNOSIS — M54.32 LEFT SCIATIC NERVE PAIN: ICD-10-CM

## 2019-07-11 PROCEDURE — 97110 THERAPEUTIC EXERCISES: CPT

## 2019-07-11 PROCEDURE — 97140 MANUAL THERAPY 1/> REGIONS: CPT

## 2019-07-11 NOTE — PROGRESS NOTES
Daily Note     Today's date: 2019  Patient name: Karen Austin  : 1965  MRN: [id number]  Referring provider: Dhaval Bravo DO  Dx:   Encounter Diagnosis     ICD-10-CM    1  Bilateral sciatica M54 31     M54 32    2  Ischial bursitis, left M70 72    3  Left sciatic nerve pain M54 32                   Subjective: Pt reports discomfort in anterior hip and buttock upon waking this a m  She continues to report reproduction of numbness in L lateral thigh shortly after standing and stated symptoms appear unchanged since beginning PT despite otherwise stated last visit  Objective: See treatment diary below    Daily Treatment Diary   Precautions: HTN, h/o Lupus    Date 6/21 6/26 6/28 7/1 7/3 7/8 7/11      FOTO IE     41        Re-eval IE              Daily Treatment Diary     Manual  6/21 6/26 6/28 7/1 7/3 7/8 7/11      PA mobs L4-5             Manual lumbar traction  15' WILMER  x10' x10' TE  x10' TE  x10' TE  x10'          Exercise Diary  6/21 6/26 6/28 7/1 7/3 7/8 7/11      Treadmill  5 min 5 min 5 min 2 0 mph 5 min 2 0 mph 5 min 2 0 mph 5 min 2 0 mph                   Stand RM lumb ext             Stand RM side glide - right  3x10  np         Stand hip ext                          Prone on elbows  4' 1' np         Prone press ups  4x10 1x10 np         Prone hip ext             Cat/Cow                          DKTC   20"x5 20"x5 20"x5 15"x5 15"x5      LTR   20"5 ea 15"x5 15"x5 15"x5 15"x5      Child's pose   20"x5 20"x5 20"x5 15"x5 15"x5      Seated hamstring stretch    30"x3 30"x3 30"x3 30"x3      pball rollouts    15"x5 fwd 15"x5 fwd 15"x5 fwd 15"x5 fwd                                                              Modalities  6/21 6/25 6/28 7/1 7/3 7/8 7/11                                                  Assessment: Tolerated treatment well   She exhibits improvement of LE's sx's with exercises, but continues to experience slow reproduction of numbness and burning sensation shortly upon standing in lateral L LE  Continues with positive response with manual traction  Patient demonstrated fatigue post treatment, exhibited good technique with therapeutic exercises and would benefit from continued PT  Plan: Continue per plan of care

## 2019-07-15 ENCOUNTER — TELEPHONE (OUTPATIENT)
Dept: OBGYN CLINIC | Facility: HOSPITAL | Age: 54
End: 2019-07-15

## 2019-07-15 ENCOUNTER — EVALUATION (OUTPATIENT)
Dept: PHYSICAL THERAPY | Facility: CLINIC | Age: 54
End: 2019-07-15
Payer: COMMERCIAL

## 2019-07-15 DIAGNOSIS — M70.72 ISCHIAL BURSITIS, LEFT: ICD-10-CM

## 2019-07-15 DIAGNOSIS — M54.31 BILATERAL SCIATICA: Primary | ICD-10-CM

## 2019-07-15 DIAGNOSIS — M54.32 BILATERAL SCIATICA: Primary | ICD-10-CM

## 2019-07-15 DIAGNOSIS — M54.32 LEFT SCIATIC NERVE PAIN: ICD-10-CM

## 2019-07-15 PROCEDURE — 97110 THERAPEUTIC EXERCISES: CPT | Performed by: PHYSICAL THERAPIST

## 2019-07-15 PROCEDURE — 97140 MANUAL THERAPY 1/> REGIONS: CPT | Performed by: PHYSICAL THERAPIST

## 2019-07-15 NOTE — TELEPHONE ENCOUNTER
Patient is calling   [phone]    Patient is calling stating that she has been doing physical therapy but they are telling her that she needs another hip injection  Patient would like to know if she can just get an order put in her chart or if she needs an appt  Patient is stating that [facility] cannot give her any other injections until she gets an mri  Patient is also stating that the mri Dr Shanell Shen ordered for her was declined because she did not try physical therapy  Patient would like to see if she can now get the approval for the mri since she has done therapy  Please advise

## 2019-07-15 NOTE — TELEPHONE ENCOUNTER
Please explain again to the patient that the reason I referred her to spine and [facility] was because she does not have a rheum issue and they are the specialty that manages the issues with her back and hip  I really have no further role in this management  In addition she just had these injections within the past 1-2 months and so it is too soon anyway for her to have repeat injections  Regarding the MRI, she should follow the recommendations of spine/pain management

## 2019-07-15 NOTE — PROGRESS NOTES
PT Re-Evaluation     Today's date: 7/15/2019  Patient name: Tanisha Morse  : 1965  MRN: [id number]  Referring provider: Olinda Maldonado DO  Dx:   Encounter Diagnosis     ICD-10-CM    1  Bilateral sciatica M54 31     M54 32    2  Ischial bursitis, left M70 72    3  Left sciatic nerve pain M54 32                   Assessment  Assessment details: Tanisha Morse has been treated in outpatient physical therapy over the past 4 weeks for diagnosis/complaints of Bilateral sciatica  (primary encounter diagnosis), Ischial bursitis, left, Left sciatic nerve pain  Since the initial evaluation, patient demonstrates unchanged subjective reports, pain levels  Patient demonstrates good lumbar range of motion and accessory mobility, good hip mobility, and negative soft tissue restrictions in lumbar region  Patient displays positive reproduction of LLE symptoms with palpation to left piriformis muscle  However, greater improvement in symptoms would have been expected over the past 4 weeks of physical therapy care  Therefore, due to relative plateau in progress, advised patient to follow up with referring physician for discussion of alternate treatment options (injection, etc) or appropriateness of further diagnostic imaging  Thank you for this referral  Please advise  Impairments: abnormal coordination, abnormal or restricted ROM, activity intolerance, impaired physical strength and pain with function  Understanding of Dx/Px/POC: good   Prognosis: good    Goals  ST  Patient will report 25% decrease in pain in 4 weeks  - NOT MET  2  Patient will demonstrate 25% improvement in ROM in 4 weeks  - MET  3  Patient will demonstrate 1/2 grade improvement in strength in 4 weeks  - NOT MET    LT  Patient will be able to perform IADLS without restriction or pain by discharge  2  Patient will be independent in HEP by discharge    3  Patient will be able to return to recreational/work duties without restriction or pain by discharge  Plan  Plan details: Placed on hold  Patient would benefit from: PT eval and skilled PT  Planned modality interventions: cryotherapy and thermotherapy: hydrocollator packs  Planned therapy interventions: IADL retraining, body mechanics training, flexibility, functional ROM exercises, home exercise program, neuromuscular re-education, manual therapy, postural training, strengthening, stretching, therapeutic activities, therapeutic exercise and joint mobilization  Treatment plan discussed with: patient        Subjective Evaluation    History of Present Illness  Mechanism of injury: 6/21: Pt reports 3-month history of left hip pain of unknown etiology and insidious onset  Reports she has seen orthopedic physician and rheumatologist    Pt reports she experiences numbness shoot down anterior aspect of left LE to first two toes when transferring to standing after sitting  Reports she experiences burning in lateral left calf with standing long periods of time  States pain is located in left lateral boarder of sacrum, radiating through left buttock to area of ischial tuberosity  Reports symptom AGGs are: lying flat (sleeps supine with LE elevated), standing > 30 mins, transfers, sitting on soft chair  Reports symptom EASES are: lying supine with left LE elevated, medications  Denies symptoms down right LE  Pt denies bowel or bladder dysfunction, saddle anesthesia, fever, chills, nausea, or vomiting  Pt also denies pain at night or recent unexplained weight loss  Reports she is currently out of work due to pain with prolonged standing and has not been to gym due to pain  Goals are to return to work without pain  7/15: Pt reports relatively no change since starting therapy  Reports she continues to have "heavy" pain in lowever left calf  Reports over the past three days her symptoms have radiated to anterior left hip, which she notes she has not experienced before     Reports symptom AGGS are: sitting and standing long periods of time  Denies pain when lying down, however, notes she sleeps on the floor  Reports symptom EASEs are: lying on floor, stretching knees to chest, and medications  Reports she is still out of work due to symptoms  States she plans to return to physician after physical therapy  Pain  Current pain rating: 3  At best pain rating: 3  At worst pain rating: 10    Patient Goals  Patient goals for therapy: decreased pain, independence with ADLs/IADLs, return to sport/leisure activities and return to work          Objective     Concurrent Complaints  Negative for night pain, disturbed sleep, bladder dysfunction, bowel dysfunction, saddle (S4) numbness and history of cancer    Palpation   Left   No palpable tenderness to the erector spinae, lumbar paraspinals and quadratus lumborum  Right   No palpable tenderness to the erector spinae, lumbar paraspinals and quadratus lumborum  Neurological Testing     Sensation     Lumbar   Left   Diminished: light touch    Right   Intact: light touch    Reflexes   Left   Patellar (L4): trace (1+)  Achilles (S1): trace (1+)  Clonus sign: negative    Right   Patellar (L4): trace (1+)  Achilles (S1): trace (1+)  Clonus sign: negative    Additional Neurological Details  7/15:  LOWER EXTREMITY DERMATOMES: decreased sensation reported left L3, L5, S2  LOWER EXTREMITY MYOTOMES: decreased strength left L4      Active Range of Motion     Lumbar   Flexion:  WFL  Extension:  WFL  Left lateral flexion:  WFL  Right lateral flexion:  [facility]    Additional Active Range of Motion Details  LUMBAR AROM -  Flexion, extension, bilat lateral flexion measured with bubble inclinometer at L1; Rotation measured with goniometer and patient seated      Joint Play   Joints within functional limits: L1, L2, L3, L4 and L5     Tests     Lumbar   Positive SIJ compression  Negative sacroiliac distraction, sacral thrust  and sacral spring        Left   Negative passive SLR and slump test      Right   Negative crossed SLR, passive SLR and slump test      Left Hip   Positive piriformis  Negative ANDREAS  Right Hip   Negative ANDREAS and piriformis       Additional Tests Details  6/21:  PA mobility testing - favorable response to PA pressure to L4 & L5    7/15: Reproduction of left LE symptoms with palpation to left piriformis muscle   7/15: Piriformis on left negative for hypomobility, however, positive for reproduction of symptoms in LLE             Precautions: HTN, h/o Lupus    Date 6/21 6/26 6/28 7/1 7/3 7/8 7/11 7/15     FOTO IE     39        Re-eval IE       WILMER       Daily Treatment Diary     Manual  6/21 6/26 6/28 7/1 7/3 7/8 7/11 7/15     PA mobs L4-5             Manual lumbar traction  15' WILMER  x10' x10' TE  x10' TE  x10' TE  x10' np         Exercise Diary  6/21 6/26 6/28 7/1 7/3 7/8 7/11 7/15     Treadmill  5 min 5 min 5 min 2 0 mph 5 min 2 0 mph 5 min 2 0 mph 5 min 2 0 mph 5 min   2 0 mph                  Stand RM lumb ext             Stand RM side glide - right  3x10  np         Stand hip ext                          Prone on elbows  4' 1' np         Prone press ups  4x10 1x10 np         Prone hip ext             Cat/Cow                          DKTC   20"x5 20"x5 20"x5 15"x5 15"x5      LTR   20"5 ea 15"x5 15"x5 15"x5 15"x5      Child's pose   20"x5 20"x5 20"x5 15"x5 15"x5      Seated hamstring stretch    30"x3 30"x3 30"x3 30"x3      pball rollouts    15"x5 fwd 15"x5 fwd 15"x5 fwd 15"x5 fwd                                                              Modalities  6/21 6/25 6/28 7/1 7/3 7/8 7/11 7/15

## 2019-07-17 ENCOUNTER — TELEPHONE (OUTPATIENT)
Dept: PAIN MEDICINE | Facility: CLINIC | Age: 54
End: 2019-07-17

## 2019-07-17 DIAGNOSIS — M54.16 LUMBAR RADICULOPATHY: Primary | ICD-10-CM

## 2019-07-17 NOTE — TELEPHONE ENCOUNTER
Call from patient  # [phone]    Patient had a PT evaluation on Monday  The therapist said that the therapy is not helping  There is a report ready about her evaluation in Epic  The therapist said Dr Nay Trinh can now try to order the MRI since she has tried the therapy and it is unsuccessful

## 2019-07-18 ENCOUNTER — APPOINTMENT (OUTPATIENT)
Dept: PHYSICAL THERAPY | Facility: CLINIC | Age: 54
End: 2019-07-18
Payer: COMMERCIAL

## 2019-07-18 NOTE — TELEPHONE ENCOUNTER
Patient returned your call & states if you call her back & she can't answer the phone, to leave a detailed message  Patient states she will try calling back around 3 then   Please advise, luis    Call back# [phone]

## 2019-07-18 NOTE — TELEPHONE ENCOUNTER
Attempted to reach pt  Left detailed message per pt's request, advising of Dr Austin Swift notation  Central scheduling number provided in message so pt can call to schedule her MRI, also explained that once she schedules her MRI the central authorization dept will handle the PA through her insurance  [place] and phone number provided

## 2019-07-22 ENCOUNTER — APPOINTMENT (OUTPATIENT)
Dept: PHYSICAL THERAPY | Facility: CLINIC | Age: 54
End: 2019-07-22
Payer: COMMERCIAL

## 2019-07-25 ENCOUNTER — HOSPITAL ENCOUNTER (OUTPATIENT)
Dept: MRI IMAGING | Facility: HOSPITAL | Age: 54
Discharge: HOME | End: 2019-07-25
Payer: COMMERCIAL

## 2019-07-25 DIAGNOSIS — M54.16 LUMBAR RADICULOPATHY: ICD-10-CM

## 2019-07-25 PROCEDURE — 72148 MRI LUMBAR SPINE W/O DYE: CPT

## 2019-07-26 ENCOUNTER — APPOINTMENT (OUTPATIENT)
Dept: PHYSICAL THERAPY | Facility: CLINIC | Age: 54
End: 2019-07-26
Payer: COMMERCIAL

## 2019-07-29 ENCOUNTER — APPOINTMENT (OUTPATIENT)
Dept: PHYSICAL THERAPY | Facility: CLINIC | Age: 54
End: 2019-07-29
Payer: COMMERCIAL

## 2019-07-29 DIAGNOSIS — M54.16 LUMBAR RADICULOPATHY: ICD-10-CM

## 2019-07-29 RX ORDER — GABAPENTIN 300 MG/1
CAPSULE ORAL
Qty: 90 CAPSULE | Refills: 1 | OUTPATIENT
Start: 2019-07-29

## 2019-07-29 NOTE — TELEPHONE ENCOUNTER
Please notify the patient the MRI of her lumbar spine reveals degenerative disc disease at L4-5 with arthritis at L3-4 through L5-S1  She does have some mild lateral recess stenosis (narrowing where nerves exist) at L4-5 secondary to disc bulge without evidence of nerve compression  I can offer the patient a left L4 and L5 TFESI to reduce the inflammatory component of her pain

## 2019-07-29 NOTE — TELEPHONE ENCOUNTER
Patient called requesting her MRI results be read to her   Please advise, luis    Call back# [phone]

## 2019-07-30 NOTE — TELEPHONE ENCOUNTER
S/W pt  Advised pt of the same  Pt would like a copy of her results and schedule the injection  Advised pt will get the message to medical records and the  will call her to schedule her  Pt verbalized understanding      -please call pt    Haydee-see above

## 2019-07-31 ENCOUNTER — TELEPHONE (OUTPATIENT)
Dept: PAIN MEDICINE | Facility: MEDICAL CENTER | Age: 54
End: 2019-07-31

## 2019-07-31 NOTE — TELEPHONE ENCOUNTER
Pt returned call, scheduled LT L4 & L5 TFESI on Mon 08/05/19 arr at 10:30  Went over pre procedure instructions, NPO 1 hr prior, if sick or on abx needs to call to rs, wear loose, comf clothing- no buttons/zippers, needs   Pt verbalized understanding

## 2019-07-31 NOTE — TELEPHONE ENCOUNTER
Pt called and asked if the most recent MRI result can be faxed to her employer- [employer] at the same number previous reports were faxed          Pt can be reached at [phone]

## 2019-07-31 NOTE — TELEPHONE ENCOUNTER
PROCEDURE ORDERED  Please transfer call to [facility] procedure  line if you have pt on phone  Called pt, DANETTEOM for pt to cb to schedule- gave direct line and hours

## 2019-08-01 NOTE — TELEPHONE ENCOUNTER
I have addressed this before in the past   I do not fill out any FMLA forms, disability paperwork, or do any kind of functional capacity evaluations  If this is what is required, then the patient must go to see a physiatrist   I did not take the patient out of work, and for any further restrictions this is either filled out by the physician that pulled the patient out of work or provided the initial restrictions or the patient will need to be referred to physiatry

## 2019-08-01 NOTE — TELEPHONE ENCOUNTER
Patient is requesting Dr Dominguez provide information pertaining to her leave of absence from her employer  She states that her original forms were filled out by her Rheumatologist Lucille Yu, but Dr Chelle Mack is no longer treating her for her diagnosis & Dr Gomez is  She also states her paperwork is scanned into her records/[employer] system for review   Please advise, luis    Call back# [phone]

## 2019-08-02 ENCOUNTER — TELEPHONE (OUTPATIENT)
Dept: OBGYN CLINIC | Facility: HOSPITAL | Age: 54
End: 2019-08-02

## 2019-08-02 NOTE — TELEPHONE ENCOUNTER
Serafin Carpenter  [phone]    Dr Shireen Tinoco    Patient requesting return to work note w/restictions on standing long periods  Pain management won't assist with problem  Please fax to employer [phone]  Claim # [id number]  Please call patient to confirm done

## 2019-08-02 NOTE — TELEPHONE ENCOUNTER
See phone note from 7/31  Also reviewed most recent pain management note  I will write return to work note since she is now requesting a return note  I cannot comment in the note about her ability to stand for long periods or her functional capacity, I have reiterated this to her more than once  Going forward, if she needs work restrictions she will need to see physiatry for further appropriate functional evaluations

## 2019-08-05 ENCOUNTER — HOSPITAL ENCOUNTER (OUTPATIENT)
Dept: RADIOLOGY | Facility: CLINIC | Age: 54
Discharge: HOME | End: 2019-08-05
Admitting: ANESTHESIOLOGY
Payer: COMMERCIAL

## 2019-08-05 VITALS
RESPIRATION RATE: 18 BRPM | DIASTOLIC BLOOD PRESSURE: 84 MMHG | HEART RATE: 63 BPM | SYSTOLIC BLOOD PRESSURE: 128 MMHG | TEMPERATURE: 97.8 F | OXYGEN SATURATION: 96 %

## 2019-08-05 DIAGNOSIS — M54.16 LUMBAR RADICULOPATHY: ICD-10-CM

## 2019-08-05 PROCEDURE — 64484 NJX AA&/STRD TFRM EPI L/S EA: CPT | Performed by: ANESTHESIOLOGY

## 2019-08-05 PROCEDURE — 64483 NJX AA&/STRD TFRM EPI L/S 1: CPT | Performed by: ANESTHESIOLOGY

## 2019-08-05 RX ORDER — LIDOCAINE HYDROCHLORIDE 10 MG/ML
5 INJECTION, SOLUTION EPIDURAL; INFILTRATION; INTRACAUDAL; PERINEURAL ONCE
Status: COMPLETED | OUTPATIENT
Start: 2019-08-05 | End: 2019-08-05

## 2019-08-05 RX ORDER — PAPAVERINE HCL 150 MG
20 CAPSULE, EXTENDED RELEASE ORAL ONCE
Status: COMPLETED | OUTPATIENT
Start: 2019-08-05 | End: 2019-08-05

## 2019-08-05 RX ADMIN — LIDOCAINE HYDROCHLORIDE 4 ML: 10 INJECTION, SOLUTION EPIDURAL; INFILTRATION; INTRACAUDAL; PERINEURAL at 10:32

## 2019-08-05 RX ADMIN — IOHEXOL 2 ML: 300 INJECTION, SOLUTION INTRAVENOUS at 10:35

## 2019-08-05 RX ADMIN — LIDOCAINE HYDROCHLORIDE 2 ML: 20 INJECTION, SOLUTION EPIDURAL; INFILTRATION; INTRACAUDAL; PERINEURAL at 10:36

## 2019-08-05 RX ADMIN — DEXAMETHASONE SODIUM PHOSPHATE 15 MG: 10 INJECTION, SOLUTION INTRAMUSCULAR; INTRAVENOUS at 10:36

## 2019-08-05 NOTE — H&P
History of Present Illness: The patient is a 48 y o  female who presents with complaints of low back and leg pain      Patient Active Problem List   Diagnosis    Migraine without aura or status migrainosus    Low vitamin D level    Insomnia    Acne    Lumbar radiculopathy    Ischial bursitis, left    Trochanteric bursitis of left hip       Past Medical History:   Diagnosis Date    Acne 02/06/2018    Greater trochanteric bursitis of left hip 05/10/2019    Headache 02/06/2018    Hypertension     Insomnia 02/06/2018    Ischial bursitis of left side 05/10/2019    Left-sided low back pain with left-sided sciatica 05/10/2019    Lupus erythematosus     Vitamin D deficiency        Past Surgical History:   Procedure Laterality Date    CT NEEDLE BX ASPIRATION INJECTION LOCALIZATION  5/20/2019    TONSILLECTOMY           Current Outpatient Medications:     cyclobenzaprine (FLEXERIL) 10 mg tablet, Take 1 tablet (10 mg total) by mouth 3 (three) times a day as needed for muscle spasms, Disp: 30 tablet, Rfl: 0    ergocalciferol (VITAMIN D2) 50,000 units, TAKE ONE CAPSULE BY MOUTH ONE TIME PER WEEK, Disp: 4 capsule, Rfl: 3    gabapentin (NEURONTIN) 300 mg capsule, Take 1 capsule (300 mg total) by mouth 3 (three) times a day, Disp: 90 capsule, Rfl: 1    hydroxychloroquine (PLAQUENIL) 200 mg tablet, Take 200 mg by mouth 2 (two) times a day, Disp: , Rfl: 0    metoprolol succinate (TOPROL-XL) 50 mg 24 hr tablet, Take 50 mg by mouth daily, Disp: , Rfl: 3    naproxen (NAPROSYN) 500 mg tablet, Take 500 mg by mouth 2 (two) times a day with meals, Disp: , Rfl:     ZOLMitriptan (ZOMIG) 2 5 MG tablet, TAKE 1 TABLET BY MOUTH ONCE-MAY REPEAT AFTER 2 HOURS IF NEEDED,NO MORE THAN 10 MG/24 HRS, Disp: 8 tablet, Rfl: 3    Allergies   Allergen Reactions    Pylera [Bis Subcit-Metronid-Tetracyc] Nausea Only    Sulfa Antibiotics Nausea Only       Physical Exam:   Vitals:    08/05/19 1008   BP: 127/84   Pulse: 58   Resp: 18 Temp: 97 8 °F (36 6 °C)   SpO2: 98%     General: Awake, Alert, Oriented x 3, Mood and affect appropriate  Respiratory: Respirations even and unlabored  Cardiovascular: Peripheral pulses intact; no edema  Musculoskeletal Exam:  Left lumbar paraspinals tender to palpation  ASA Score: 2    Patient/Chart Verification  Patient ID Verified: Verbal  ID Band Applied: No  Consents Confirmed: Procedural  H&P( within 30 days) Verified: To be obtained in the Pre-Procedure area  Interval H&P(within 24 hr) Complete (required for Outpatients and Surgery Admit only): To be obtained in the Pre-Procedure area  Allergies Reviewed: Yes  Anticoag/NSAID held?: No  Currently on antibiotics?: No  Pre-op Lab/Test Results Available: N/A  Pregnancy Lab Collected: N/A comment    Assessment:   1   Lumbar radiculopathy        Plan: left L4 and L5 TFESI

## 2019-08-05 NOTE — DISCHARGE INSTRUCTIONS
Epidural Steroid Injection   WHAT YOU NEED TO KNOW:   An epidural steroid injection (BEULAH) is a procedure to inject steroid medicine into the epidural space  The epidural space is between your spinal cord and vertebrae  Steroids reduce inflammation and fluid buildup in your spine that may be causing pain  You may be given pain medicine along with the steroids  ACTIVITY  · Do not drive or operate machinery today  · No strenuous activity today - bending, lifting, etc   · You may resume normal activites starting tomorrow - start slowly and as tolerated  · You may shower today, but no tub baths or hot tubs  · You may have numbness for several hours from the local anesthetic  Please use caution and common sense, especially with weight-bearing activities  CARE OF THE INJECTION SITE  · If you have soreness or pain, apply ice to the area today (20 minutes on/20 minutes off)  · Starting tomorrow, you may use warm, moist heat or ice if needed  · You may have an increase or change in your discomfort for 36-48 hours after your treatment  · Apply ice and continue with any pain medication you have been prescribed  · Notify [facility] if you have any of the following: redness, drainage, swelling, headache, stiff neck or fever above 100°F     SPECIAL INSTRUCTIONS  · Our office will contact you in approximately 7 days for a progress report  MEDICATIONS  · Continue to take all routine medications  · Our office may have instructed you to hold some medications  If you have a problem specifically related to your procedure, please call our office at [phone]  Problems not related to your procedure should be directed to your primary care physician

## 2019-08-12 ENCOUNTER — TELEPHONE (OUTPATIENT)
Dept: PAIN MEDICINE | Facility: CLINIC | Age: 54
End: 2019-08-12

## 2019-08-12 ENCOUNTER — TELEPHONE (OUTPATIENT)
Dept: PAIN MEDICINE | Facility: MEDICAL CENTER | Age: 54
End: 2019-08-12

## 2019-08-12 NOTE — TELEPHONE ENCOUNTER
Pt called back and stated the pain in her leg is now gone, pt is back at work  but after 4-5 hrs her back begins to hurt      Pt also states her left hip is also bothering her     6-7/10

## 2019-08-19 NOTE — TELEPHONE ENCOUNTER
Pt return call and her hip is now back to the original pain  Numbness and burning on the outside of her leg is back if she stands or sitting for periods of time      Pt can be reached at [phone]

## 2019-08-21 NOTE — TELEPHONE ENCOUNTER
Pt is calling regarding task below  Informed pt that doctor is not available until next week, pt stated that what she should do in the meantime, since she is in a lot of pain  Please advise       Pt can be reached at [phone]

## 2019-08-23 NOTE — TELEPHONE ENCOUNTER
Dr Garry Khanna patient     Patient called requesting to schedule her next injection  I attempted to call Dr Garry Khanna procedure  & got her voicemail  Patient is currently at work & isn't able to receive calls  She states she will call on Monday the 8/26 to schedule

## 2019-08-26 NOTE — TELEPHONE ENCOUNTER
Spoke to pt, scheduled left L4 and L5 TFESI on Wed 09/11/19 arr at 09:45  Went over pre procedure instructions, NPO 1 hr prior, if sick or on abx needs to call to rs, wear loose, comf clothing- no buttons/zippers, needs   Pt verbalized understanding

## 2019-09-11 ENCOUNTER — HOSPITAL ENCOUNTER (OUTPATIENT)
Dept: RADIOLOGY | Facility: CLINIC | Age: 54
Discharge: HOME | End: 2019-09-11
Attending: ANESTHESIOLOGY | Admitting: ANESTHESIOLOGY
Payer: COMMERCIAL

## 2019-09-11 VITALS
RESPIRATION RATE: 18 BRPM | DIASTOLIC BLOOD PRESSURE: 71 MMHG | HEART RATE: 96 BPM | OXYGEN SATURATION: 96 % | TEMPERATURE: 98.7 F | SYSTOLIC BLOOD PRESSURE: 105 MMHG

## 2019-09-11 DIAGNOSIS — M54.16 LUMBAR RADICULOPATHY: ICD-10-CM

## 2019-09-11 PROCEDURE — 64483 NJX AA&/STRD TFRM EPI L/S 1: CPT | Performed by: ANESTHESIOLOGY

## 2019-09-11 PROCEDURE — 64484 NJX AA&/STRD TFRM EPI L/S EA: CPT | Performed by: ANESTHESIOLOGY

## 2019-09-11 RX ORDER — LIDOCAINE HYDROCHLORIDE 10 MG/ML
5 INJECTION, SOLUTION EPIDURAL; INFILTRATION; INTRACAUDAL; PERINEURAL ONCE
Status: COMPLETED | OUTPATIENT
Start: 2019-09-11 | End: 2019-09-11

## 2019-09-11 RX ORDER — PAPAVERINE HCL 150 MG
20 CAPSULE, EXTENDED RELEASE ORAL ONCE
Status: COMPLETED | OUTPATIENT
Start: 2019-09-11 | End: 2019-09-11

## 2019-09-11 RX ADMIN — LIDOCAINE HYDROCHLORIDE 2 ML: 20 INJECTION, SOLUTION EPIDURAL; INFILTRATION; INTRACAUDAL; PERINEURAL at 10:10

## 2019-09-11 RX ADMIN — IOHEXOL 2 ML: 300 INJECTION, SOLUTION INTRAVENOUS at 10:08

## 2019-09-11 RX ADMIN — LIDOCAINE HYDROCHLORIDE 4 ML: 10 INJECTION, SOLUTION EPIDURAL; INFILTRATION; INTRACAUDAL; PERINEURAL at 10:05

## 2019-09-11 RX ADMIN — DEXAMETHASONE SODIUM PHOSPHATE 15 MG: 10 INJECTION, SOLUTION INTRAMUSCULAR; INTRAVENOUS at 10:10

## 2019-09-11 NOTE — DISCHARGE INSTRUCTIONS
Epidural Steroid Injection   WHAT YOU NEED TO KNOW:   An epidural steroid injection (BEULAH) is a procedure to inject steroid medicine into the epidural space  The epidural space is between your spinal cord and vertebrae  Steroids reduce inflammation and fluid buildup in your spine that may be causing pain  You may be given pain medicine along with the steroids  ACTIVITY  · Do not drive or operate machinery today  · No strenuous activity today - bending, lifting, etc   · You may resume normal activites starting tomorrow - start slowly and as tolerated  · You may shower today, but no tub baths or hot tubs  · You may have numbness for several hours from the local anesthetic  Please use caution and common sense, especially with weight-bearing activities  CARE OF THE INJECTION SITE  · If you have soreness or pain, apply ice to the area today (20 minutes on/20 minutes off)  · Starting tomorrow, you may use warm, moist heat or ice if needed  · You may have an increase or change in your discomfort for 36-48 hours after your treatment  · Apply ice and continue with any pain medication you have been prescribed  · [facility]ify [facility] if you have any of the following: redness, drainage, swelling, headache, stiff neck or fever above 100°F     SPECIAL INSTRUCTIONS  · Our office will contact you in approximately 7 days for a progress report  MEDICATIONS  · Continue to take all routine medications  · Our office may have instructed you to hold some medications  If you have a problem specifically related to your procedure, please call our office at [phone]  Problems not related to your procedure should be directed to your primary care physician

## 2019-09-11 NOTE — H&P
History of Present Illness: The patient is a 48 y o  female who presents with complaints of low back and left leg pain      Patient Active Problem List   Diagnosis    Migraine without aura or status migrainosus    Low vitamin D level    Insomnia    Acne    Lumbar radiculopathy    Ischial bursitis, left    Trochanteric bursitis of left hip       Past Medical History:   Diagnosis Date    Acne 02/06/2018    Greater trochanteric bursitis of left hip 05/10/2019    Headache 02/06/2018    Hypertension     Insomnia 02/06/2018    Ischial bursitis of left side 05/10/2019    Left-sided low back pain with left-sided sciatica 05/10/2019    Lupus erythematosus     Vitamin D deficiency        Past Surgical History:   Procedure Laterality Date    CT NEEDLE BX ASPIRATION INJECTION LOCALIZATION  5/20/2019    TONSILLECTOMY           Current Outpatient Medications:     cyclobenzaprine (FLEXERIL) 10 mg tablet, Take 1 tablet (10 mg total) by mouth 3 (three) times a day as needed for muscle spasms, Disp: 30 tablet, Rfl: 0    ergocalciferol (VITAMIN D2) 50,000 units, TAKE ONE CAPSULE BY MOUTH ONE TIME PER WEEK, Disp: 4 capsule, Rfl: 3    gabapentin (NEURONTIN) 300 mg capsule, Take 1 capsule (300 mg total) by mouth 3 (three) times a day, Disp: 270 capsule, Rfl: 3    hydroxychloroquine (PLAQUENIL) 200 mg tablet, Take 200 mg by mouth 2 (two) times a day, Disp: , Rfl: 0    metoprolol succinate (TOPROL-XL) 50 mg 24 hr tablet, Take 50 mg by mouth daily, Disp: , Rfl: 3    naproxen (NAPROSYN) 500 mg tablet, Take 500 mg by mouth 2 (two) times a day with meals, Disp: , Rfl:     ZOLMitriptan (ZOMIG) 2 5 MG tablet, TAKE 1 TABLET BY MOUTH ONCE-MAY REPEAT AFTER 2 HOURS IF NEEDED,NO MORE THAN 10 MG/24 HRS, Disp: 8 tablet, Rfl: 3    Allergies   Allergen Reactions    Pylera [Bis Subcit-Metronid-Tetracyc] Nausea Only    Sulfa Antibiotics Nausea Only       Physical Exam:   Vitals:    09/11/19 0930   BP: 105/73   Pulse: 66   Resp: 18 Temp: 98 7 °F (37 1 °C)   SpO2: 98%     General: Awake, Alert, Oriented x 3, Mood and affect appropriate  Respiratory: Respirations even and unlabored  Cardiovascular: Peripheral pulses intact; no edema  Musculoskeletal Exam:  Left lumbar paraspinals tender to palpation  ASA Score: 2    Patient/Chart Verification  Patient ID Verified: Verbal  ID Band Applied: No  Consents Confirmed: Procedural  H&P( within 30 days) Verified: To be obtained in the Pre-Procedure area  Interval H&P(within 24 hr) Complete (required for Outpatients and Surgery Admit only): To be obtained in the Pre-Procedure area  Allergies Reviewed: Yes  Anticoag/NSAID held?: No  Currently on antibiotics?: No  Pre-op Lab/Test Results Available: N/A  Pregnancy Lab Collected: N/A comment    Assessment:   1   Lumbar radiculopathy        Plan: left L4 and L5 TFESI

## 2019-09-12 DIAGNOSIS — M54.50 LUMBAR PAIN: ICD-10-CM

## 2019-09-12 RX ORDER — ERGOCALCIFEROL 1.25 MG/1
CAPSULE ORAL
Qty: 12 CAPSULE | Refills: 1 | Status: SHIPPED | OUTPATIENT
Start: 2019-09-12 | End: 2021-11-03

## 2019-09-17 ENCOUNTER — APPOINTMENT (OUTPATIENT)
Dept: LAB | Facility: HOSPITAL | Age: 54
End: 2019-09-17
Payer: COMMERCIAL

## 2019-09-17 ENCOUNTER — TRANSCRIBE ORDERS (OUTPATIENT)
Dept: ADMINISTRATIVE | Facility: HOSPITAL | Age: 54
End: 2019-09-17

## 2019-09-17 DIAGNOSIS — L98.8 VASCULAR DISORDER OF SKIN: Primary | ICD-10-CM

## 2019-09-17 DIAGNOSIS — L98.8 VASCULAR DISORDER OF SKIN: ICD-10-CM

## 2019-09-17 DIAGNOSIS — Z79.899 ENCOUNTER FOR LONG-TERM (CURRENT) USE OF OTHER MEDICATIONS: ICD-10-CM

## 2019-09-17 LAB
ALBUMIN SERPL BCP-MCNC: 4.3 G/DL (ref 3–5.2)
ALP SERPL-CCNC: 42 U/L (ref 43–122)
ALT SERPL W P-5'-P-CCNC: 29 U/L (ref 9–52)
ANION GAP SERPL CALCULATED.3IONS-SCNC: 5 MMOL/L (ref 5–14)
AST SERPL W P-5'-P-CCNC: 27 U/L (ref 14–36)
BASOPHILS # BLD AUTO: 0 THOUSANDS/ΜL (ref 0–0.1)
BASOPHILS NFR BLD AUTO: 1 % (ref 0–1)
BILIRUB SERPL-MCNC: 0.4 MG/DL
BUN SERPL-MCNC: 9 MG/DL (ref 5–25)
CALCIUM SERPL-MCNC: 9.4 MG/DL (ref 8.4–10.2)
CHLORIDE SERPL-SCNC: 103 MMOL/L (ref 97–108)
CO2 SERPL-SCNC: 31 MMOL/L (ref 22–30)
CREAT SERPL-MCNC: 0.61 MG/DL (ref 0.6–1.2)
EOSINOPHIL # BLD AUTO: 0.1 THOUSAND/ΜL (ref 0–0.4)
EOSINOPHIL NFR BLD AUTO: 2 % (ref 0–6)
ERYTHROCYTE [DISTWIDTH] IN BLOOD BY AUTOMATED COUNT: 14 %
GFR SERPL CREATININE-BSD FRML MDRD: 105 ML/MIN/1.73SQ M
GLUCOSE P FAST SERPL-MCNC: 93 MG/DL (ref 70–99)
HCT VFR BLD AUTO: 41.5 % (ref 36–46)
HGB BLD-MCNC: 13.9 G/DL (ref 12–16)
LYMPHOCYTES # BLD AUTO: 2.7 THOUSANDS/ΜL (ref 0.5–4)
LYMPHOCYTES NFR BLD AUTO: 45 % (ref 25–45)
MCH RBC QN AUTO: 32.1 PG (ref 26–34)
MCHC RBC AUTO-ENTMCNC: 33.5 G/DL (ref 31–36)
MCV RBC AUTO: 96 FL (ref 80–100)
MONOCYTES # BLD AUTO: 0.4 THOUSAND/ΜL (ref 0.2–0.9)
MONOCYTES NFR BLD AUTO: 6 % (ref 1–10)
NEUTROPHILS # BLD AUTO: 2.9 THOUSANDS/ΜL (ref 1.8–7.8)
NEUTS SEG NFR BLD AUTO: 47 % (ref 45–65)
PLATELET # BLD AUTO: 275 THOUSANDS/UL (ref 150–450)
PMV BLD AUTO: 9 FL (ref 8.9–12.7)
POTASSIUM SERPL-SCNC: 4.8 MMOL/L (ref 3.6–5)
PROT SERPL-MCNC: 7.1 G/DL (ref 5.9–8.4)
RBC # BLD AUTO: 4.33 MILLION/UL (ref 4–5.2)
SODIUM SERPL-SCNC: 139 MMOL/L (ref 137–147)
WBC # BLD AUTO: 6.1 THOUSAND/UL (ref 4.5–11)

## 2019-09-17 PROCEDURE — 85025 COMPLETE CBC W/AUTO DIFF WBC: CPT

## 2019-09-17 PROCEDURE — 80053 COMPREHEN METABOLIC PANEL: CPT

## 2019-09-17 PROCEDURE — 36415 COLL VENOUS BLD VENIPUNCTURE: CPT

## 2019-09-18 ENCOUNTER — TELEPHONE (OUTPATIENT)
Dept: PAIN MEDICINE | Facility: CLINIC | Age: 54
End: 2019-09-18

## 2019-10-09 ENCOUNTER — TELEPHONE (OUTPATIENT)
Dept: PAIN MEDICINE | Facility: MEDICAL CENTER | Age: 54
End: 2019-10-09

## 2019-10-09 NOTE — TELEPHONE ENCOUNTER
Pt is calling stating that her back pain is back pain level 7/10 when she is tired and she is stating that she is now having left hip pain and a burning sensation on her lower left leg      Pt was seen last on 9/11 for an injection  Pt can be reached at [phone]-- leave a detailed message

## 2019-10-10 NOTE — TELEPHONE ENCOUNTER
Attempted to reach patient  LVMOM with CB#, [place] provided  Please get nurse to the phone when patient calls   TY

## 2019-10-10 NOTE — TELEPHONE ENCOUNTER
Pt called and would like to speak with the nurse,  She will be leaving for work by 12:30  Pt will also be available tomorrow morning around 8 am      Pt can be reached at [phone]

## 2019-10-11 NOTE — TELEPHONE ENCOUNTER
Anesthesia Pre-Procedure Evaluation    Patient: Jaqueline Canales   MRN: [id number] : 1937          Preoperative Diagnosis: FAMILY HISTORY OF COLON CANCER    Procedure(s):  COLONOSCOPY    Past Medical History:   Diagnosis Date     Amaurosis fugax     Right     Carotid artery stenosis      Esophageal reflux 3/11/2004     HELICOBACTER PYLORI INFECTION 2006     hx of CA in situ RT breast-.mastectomy 2007     Hyperlipidemia LDL goal <70 2011     Lung cancer (H)     squamous cell lung ca left lower lobe     OBESITY NOS 3/11/2004     OSTEOPOROSIS NOS 3/11/2004     Other psoriasis 3/11/2004     RA (rheumatoid arthritis) (H) 2010     Raynaud's syndrome 4/10/2006     Scleroderma (H)      Sleep apnea     CPAP     Past Surgical History:   Procedure Laterality Date     BREAST SURGERY       C NONSPECIFIC PROCEDURE      mastectomy RT breast     COLONOSCOPY  16     CONIZATION       CV RIGHT HEART CATH N/A 4/15/2019    Procedure: Heart Cath Right Heart Cath;  Surgeon: Naresh Cyr MD;  Location:  HEART [facility] CATH LAB     ENDARTERECTOMY CAROTID       HC COLONOSCOPY THRU STOMA, DIAGNOSTIC  2001    diverticulosis     REPAIR HAMMER TOE       TONSILLECTOMY         Anesthesia Evaluation     .             ROS/MED HX    ENT/Pulmonary:     (+)sleep apnea, tobacco use, Past use uses CPAP , . Other pulmonary disease interstitial lung dz. H/o lung CA.    Neurologic:       Cardiovascular:     (+) hypertension-Peripheral Vascular Disease-- Carotid Stenosis, --. : . . . :. dysrhythmias a-fib, . pulmonary hypertension,       METS/Exercise Tolerance:     Hematologic:  - neg hematologic  ROS       Musculoskeletal:   (+) arthritis,  -       GI/Hepatic:     (+) GERD hiatal hernia, Other GI/Hepatic colon polyps      Renal/Genitourinary:         Endo:     (+) thyroid problem hypothyroidism, Obesity, .      Psychiatric:         Infectious Disease:  - neg infectious disease ROS      S/w the patient and she stated since her last injection her pain has been slowly coming back  She had previously decreased her gabapentin down to 100mg at HS but then when the pain came back she decided to start titrating it back up and now she is back up to 200mg at HS  She stated the pain continues in her left hip and radiates down to her LLE, and it affects her calf and ankle causing a burning pain  She denies taking any other pain medication or anticoagulants  Emotional support provided  Please advise   Thanks "  Malignancy:   (+) Malignancy History of Lung and Breast  Lung CA Remission status post. Breast CA Remission status post.         Other:                          Physical Exam  Normal systems: cardiovascular, pulmonary and dental    Airway   Mallampati: II  TM distance: >3 FB  Neck ROM: full    Dental     Cardiovascular       Pulmonary             Lab Results   Component Value Date    WBC 6.1 05/01/2019    HGB 12.0 05/01/2019    HCT 36.8 05/01/2019     (L) 05/01/2019    CRP 4.1 10/31/2016    SED 60 (H) 10/22/2016     06/21/2019    POTASSIUM 4.2 06/21/2019    CHLORIDE 102 06/21/2019    CO2 28 06/21/2019    BUN 18 06/21/2019    CR 0.90 06/21/2019    GLC 90 06/21/2019    TU 10.3 06/21/2019    MAG 2.5 (H) 10/23/2016    ALBUMIN 4.1 04/02/2019    PROTTOTAL 7.4 04/02/2019    ALT 36 04/02/2019    AST 27 04/02/2019    ALKPHOS 88 04/02/2019    BILITOTAL 0.7 04/02/2019    LIPASE 61 05/21/2011    PTT 36 04/15/2019    INR 1.05 04/15/2019    TSH 3.70 10/25/2018    T4 0.96 08/17/2015       Preop Vitals  BP Readings from Last 3 Encounters:   06/24/19 101/66   06/13/19 104/70   05/29/19 104/70    Pulse Readings from Last 3 Encounters:   06/24/19 80   06/13/19 60   05/29/19 77      Resp Readings from Last 3 Encounters:   05/24/19 17   04/15/19 18   01/15/19 17    SpO2 Readings from Last 3 Encounters:   06/24/19 96%   06/13/19 98%   05/29/19 96%      Temp Readings from Last 1 Encounters:   06/24/19 36.2  C (97.1  F) (Oral)    Ht Readings from Last 1 Encounters:   06/24/19 1.575 m (5' 2\")      Wt Readings from Last 1 Encounters:   06/24/19 99.7 kg (219 lb 11.2 oz)    Estimated body mass index is 40.18 kg/m  as calculated from the following:    Height as of 6/24/19: 1.575 m (5' 2\").    Weight as of 6/24/19: 99.7 kg (219 lb 11.2 oz).       Anesthesia Plan      History & Physical Review  History and physical reviewed and following examination; no interval change.    ASA Status:  3 .    NPO Status:  > 8 hours    Plan for MAC " Reason for MAC:  Deep or markedly invasive procedure (G8)  PONV prophylaxis:  Ondansetron (or other 5HT-3)       Postoperative Care  Postoperative pain management:  IV analgesics.      Consents  Anesthetic plan, risks, benefits and alternatives discussed with:  Patient..                 Chava Valencia, DO, DO

## 2019-11-01 ENCOUNTER — OFFICE VISIT (OUTPATIENT)
Dept: PAIN MEDICINE | Facility: CLINIC | Age: 54
End: 2019-11-01
Payer: COMMERCIAL

## 2019-11-01 VITALS
BODY MASS INDEX: 28.17 KG/M2 | SYSTOLIC BLOOD PRESSURE: 97 MMHG | HEART RATE: 64 BPM | DIASTOLIC BLOOD PRESSURE: 75 MMHG | HEIGHT: 63 IN | WEIGHT: 159 LBS

## 2019-11-01 DIAGNOSIS — M54.50 CHRONIC BILATERAL LOW BACK PAIN WITHOUT SCIATICA: ICD-10-CM

## 2019-11-01 DIAGNOSIS — M54.16 LUMBAR RADICULOPATHY: ICD-10-CM

## 2019-11-01 DIAGNOSIS — G89.4 CHRONIC PAIN SYNDROME: Primary | ICD-10-CM

## 2019-11-01 DIAGNOSIS — M47.816 LUMBAR SPONDYLOSIS: ICD-10-CM

## 2019-11-01 DIAGNOSIS — G89.29 CHRONIC BILATERAL LOW BACK PAIN WITHOUT SCIATICA: ICD-10-CM

## 2019-11-01 PROCEDURE — 99214 OFFICE O/P EST MOD 30 MIN: CPT | Performed by: NURSE PRACTITIONER

## 2019-11-01 RX ORDER — GABAPENTIN 100 MG/1
100 CAPSULE ORAL 3 TIMES DAILY
Refills: 0 | COMMUNITY
Start: 2019-10-16 | End: 2019-11-01

## 2019-11-01 RX ORDER — GABAPENTIN 400 MG/1
CAPSULE ORAL
Qty: 30 CAPSULE | Refills: 0 | Status: SHIPPED | OUTPATIENT
Start: 2019-11-01 | End: 2019-12-03 | Stop reason: SDUPTHER

## 2019-11-01 NOTE — PATIENT INSTRUCTIONS
Call before out of gabapentin to give us an update and let us know what dose you are on and want to stay on

## 2019-11-25 ENCOUNTER — TELEPHONE (OUTPATIENT)
Dept: PAIN MEDICINE | Facility: MEDICAL CENTER | Age: 54
End: 2019-11-25

## 2019-11-25 NOTE — TELEPHONE ENCOUNTER
Pt is calling stating that she is in a lot of pain left side hip pain going to her leg  Pain level is 10/10  Pt stated that she is taking the Gapabentin and ibuprofen and she gets some relief but the pain comes back 2 hours later  Pt is stating she got 2 injections already in her back and per note on 11/1 DG offered another injection but her pain is in her hips  Can pt receive another hip injection instead? Please advise or can medication be prescribed          Pt can be reached at [phone] please leave a detailed message for patient

## 2019-11-25 NOTE — TELEPHONE ENCOUNTER
She likely had a trochanteric bursa injection, however that will only help with localized left lateral hip pain  It is not going to help with the pain traveling from the hip down to the foot  For that type of pain the repeat epidural injections would be most helpful    I do not have any further recommendations regarding medications at this time and would need to discuss any medication changes at her next office visit

## 2019-11-25 NOTE — TELEPHONE ENCOUNTER
S/w pt, she said her L hip and leg pain is very severe  She said the Gabapentin 400 mg at HS is not helping anymore and just makes her sleepy  Pt is wondering if she can get a L hip injection? She said she has had one in the past with rheumatology that really helped  Pt said that pain starts in her L hip and radiates down the outside of her L leg to her foot

## 2019-11-26 NOTE — TELEPHONE ENCOUNTER
KRANTHI patient, advised of JW recommendations regarding repeat epidural injections  Patient states that the pain on her left side more in her leg and foot and not in her hip  Patient states she is taking gabapentin 300 mg in the morning and 400 mg at night  Patient is also taking ibuprofen 2 times daily 400 mg  Please advise   TY

## 2019-11-26 NOTE — TELEPHONE ENCOUNTER
Patient called back and said her next ov is not until 1/24/20 and she wants to know if the doctor can give her any pain medication until he see's her?

## 2019-12-01 DIAGNOSIS — M54.16 LUMBAR RADICULOPATHY: ICD-10-CM

## 2019-12-01 RX ORDER — GABAPENTIN 400 MG/1
CAPSULE ORAL
Qty: 30 CAPSULE | Refills: 0 | OUTPATIENT
Start: 2019-12-01

## 2019-12-03 ENCOUNTER — OFFICE VISIT (OUTPATIENT)
Dept: PAIN MEDICINE | Facility: CLINIC | Age: 54
End: 2019-12-03
Payer: COMMERCIAL

## 2019-12-03 VITALS
SYSTOLIC BLOOD PRESSURE: 133 MMHG | BODY MASS INDEX: 28 KG/M2 | WEIGHT: 158 LBS | HEART RATE: 64 BPM | DIASTOLIC BLOOD PRESSURE: 87 MMHG | HEIGHT: 63 IN

## 2019-12-03 DIAGNOSIS — M54.16 LUMBAR RADICULOPATHY: Primary | ICD-10-CM

## 2019-12-03 DIAGNOSIS — M47.816 LUMBAR SPONDYLOSIS: ICD-10-CM

## 2019-12-03 DIAGNOSIS — M70.72 ISCHIAL BURSITIS, LEFT: ICD-10-CM

## 2019-12-03 PROCEDURE — 99214 OFFICE O/P EST MOD 30 MIN: CPT | Performed by: NURSE PRACTITIONER

## 2019-12-03 RX ORDER — IBUPROFEN 400 MG/1
400 TABLET ORAL EVERY 6 HOURS PRN
COMMUNITY
End: 2019-12-03

## 2019-12-03 RX ORDER — GABAPENTIN 400 MG/1
CAPSULE ORAL
Qty: 90 CAPSULE | Refills: 1 | Status: SHIPPED | OUTPATIENT
Start: 2019-12-03

## 2019-12-03 RX ORDER — IBUPROFEN 600 MG/1
600 TABLET ORAL EVERY 6 HOURS PRN
Qty: 120 TABLET | Refills: 2 | Status: SHIPPED | OUTPATIENT
Start: 2019-12-03

## 2019-12-03 NOTE — PROGRESS NOTES
Assessment:  1  Lumbar radiculopathy    2  Lumbar spondylosis    3  Ischial bursitis, left        Plan:  1  I will schedule the patient for a repeat left L4 and L5 TFESI to address the inflammatory component of the patient's low back and left lower extremity pain  Complete risks and benefits including bleeding, infection, tissue reaction, nerve injury and allergic reaction were discussed  The patient was agreeable and verbalized an understanding  2   Approximately 2 weeks following epidural injection, I will schedule the patient for a left ischial bursa injection under ultrasound to address her localized pain  She did have great relief from this injection previously when ordered by rheumatology  3  I will increase gabapentin to 800 mg q h s  X5 days, then she may increase to 1200 mg q h s  If she is not experiencing relief or side effects  I advised the patient that if they experience any side effects or issues with the changes in their medication regiment, they should give our office a call to discuss  I also advised the patient not to drive or operate machinery until they see how the changes in the medication regimen affects them  The patient was agreeable and verbalized an understanding  4  Patient does receive excellent relief with ibuprofen, however she is currently taking 400 mg Q 6 hours  I will increase this to 600 mg Q 6 hours prn to further help with her pain complaints  She is advised to avoid other NSAIDs while on this medication  5  Patient may continue with Tylenol p r n  And should not exceed more than 3000 mg daily  6  Patient will continue with her home exercise program  7  The patient will follow-up in 4 weeks and after the procedure or sooner if needed  M*Modal software was used to dictate this note  It may contain errors with dictating incorrect words or incorrect spelling  Please contact the provider directly with any questions  History of Present Illness:     The patient is a 48 y o  female last seen on 11/1/19 who presents for a follow up office visit in regards to chronic left-sided low back pain that radiates to the lateral aspect of the left hip to the ankle with associated weakness secondary to lumbar spondylosis and stenosis  The patient denies right lower extremity symptoms, bowel or bladder incontinence or saddle anesthesia  She has not found any relief with physical therapy  She does find good relief with ibuprofen  She is status post left L4 and L5 TFESI x2  She states the injections help her leg pain for a few weeks before the pain returns  She has also had a left greater trochanter bursa injection and left ischial bursa injection by radiology that was ordered by rheumatology which she states has significantly improved her left buttock pain  She denies any point tenderness to the left greater trochanter bursa today  MRI of the lumbar spine reveals bilateral lateral recess stenosis with bilateral L5 nerve root encroachment at L4-5, otherwise multilevel lumbar spondylosis  The patient currently rates her pain a 10/10 on the numeric pain rating scale  She states her pain is constant nature and bothersome in the morning  She characterizes the pain as burning, sharp, pressure like and numbness  Current pain medications includes:  Gabapentin 400 mg q h s  And ibuprofen 400 mg p r n     The patient reports that this regimen is providing 70% pain relief  The patient is reporting no side effects from this pain medication regimen  She has tried some topicals with mild relief  I have personally reviewed and/or updated the patient's past medical history, past surgical history, family history, social history, current medications, allergies, and vital signs today  Review of Systems:    Review of Systems   Respiratory: Negative for shortness of breath  Cardiovascular: Negative for chest pain     Gastrointestinal: Negative for constipation, diarrhea, nausea and vomiting  Musculoskeletal: Negative for arthralgias, gait problem, joint swelling and myalgias  Skin: Negative for rash  Neurological: Negative for dizziness, seizures and weakness  All other systems reviewed and are negative          Past Medical History:   Diagnosis Date    Acne 02/06/2018    Greater trochanteric bursitis of left hip 05/10/2019    Headache 02/06/2018    Hypertension     Insomnia 02/06/2018    Ischial bursitis of left side 05/10/2019    Left-sided low back pain with left-sided sciatica 05/10/2019    Lupus erythematosus     Vitamin D deficiency        Past Surgical History:   Procedure Laterality Date    CT NEEDLE BX ASPIRATION INJECTION LOCALIZATION  5/20/2019    TONSILLECTOMY         Family History   Problem Relation Age of Onset    No Known Problems Mother     No Known Problems Father        Social History     Occupational History    Not on file   Tobacco Use    Smoking status: Never Smoker    Smokeless tobacco: Never Used   Substance and Sexual Activity    Alcohol use: No    Drug use: No    Sexual activity: Not on file         Current Outpatient Medications:     ergocalciferol (VITAMIN D2) 50,000 units, TAKE ONE CAPSULE BY MOUTH ONE TIME PER WEEK, Disp: 12 capsule, Rfl: 1    gabapentin (NEURONTIN) 400 mg capsule, Take 2 PO HS x 5 days, then increase to 3 PO HS, Disp: 90 capsule, Rfl: 1    hydroxychloroquine (PLAQUENIL) 200 mg tablet, Take 200 mg by mouth 2 (two) times a day, Disp: , Rfl: 0    ibuprofen (MOTRIN) 400 mg tablet, Take 400 mg by mouth every 6 (six) hours as needed for mild pain, Disp: , Rfl:     metoprolol succinate (TOPROL-XL) 50 mg 24 hr tablet, Take 50 mg by mouth daily, Disp: , Rfl: 3    ZOLMitriptan (ZOMIG) 2 5 MG tablet, TAKE 1 TABLET BY MOUTH ONCE-MAY REPEAT AFTER 2 HOURS IF NEEDED,NO MORE THAN 10 MG/24 HRS, Disp: 8 tablet, Rfl: 3    ibuprofen (MOTRIN) 600 mg tablet, Take 1 tablet (600 mg total) by mouth every 6 (six) hours as needed for mild pain, Disp: 120 tablet, Rfl: 2    Allergies   Allergen Reactions    Other     Pylera [Bis Subcit-Metronid-Tetracyc] Nausea Only    Sulfa Antibiotics Nausea Only       Physical Exam:    /87   Pulse 64   Ht 5' 3" (1 6 m)   Wt 71 7 kg (158 lb)   BMI 27 99 kg/m²     Constitutional:normal, well developed, well nourished, alert, in no distress and non-toxic and no overt pain behavior  Eyes:anicteric  HEENT:grossly intact  Neck:supple, symmetric, trachea midline and no masses   Pulmonary:even and unlabored  Cardiovascular:No edema or pitting edema present  Skin:Normal without rashes or lesions and well hydrated  Psychiatric:Mood and affect appropriate  Neurologic:Cranial Nerves II-XII grossly intact  Musculoskeletal:Slightly antalgic gait but steady without the use of assistive devices  Left greater trochanter bursa nontender to palpation  Left ischial bursa tender to palpation  Equivocal straight leg raise on the left and negative on the right  Imaging  FL spine and pain procedure    (Results Pending)     MRI LUMBAR SPINE WITHOUT CONTRAST     INDICATION: M54 16: Low back, left hip pain     COMPARISON:  4/17/2019 x-rays     TECHNIQUE:  Sagittal T1, sagittal T2, sagittal inversion recovery, axial T1 and axial T2, coronal T2     IMAGE QUALITY:  Diagnostic     FINDINGS:     VERTEBRAL BODIES:  Normal alignment of the lumbar spine  No spondylolysis or spondylolisthesis  No scoliosis  No compression fracture  Normal marrow signal is identified within the visualized bony structures  No discrete marrow lesion      SACRUM:  Normal signal within the sacrum   No evidence of insufficiency or stress fracture      DISTAL CORD AND CONUS:  Normal size and signal within the distal cord and conus        PARASPINAL SOFT TISSUES:  Paraspinal soft tissues are unremarkable      LOWER THORACIC DISC SPACES:  Normal disc height and signal   No disc herniation, canal stenosis or foraminal narrowing      LUMBAR DISC SPACES:     L1-L2:  Normal      L2-L3:  Normal      L3-L4:  Normal disc, mild degenerative facet arthrosis, no stenosis     L4-L5:  Mild degenerative spondylosis and bulging annulus  Mild bilateral lateral recess stenosis    Possible bilateral L5 nerve root encroachment      L5-S1:  Mild degenerative spondylosis, no stenosis     IMPRESSION:  Mild multilevel degenerative spondylosis, consistent with x-rays     Mild bilateral lateral recess stenosis L4-5    Orders Placed This Encounter   Procedures    FL spine and pain procedure

## 2019-12-10 ENCOUNTER — TRANSCRIBE ORDERS (OUTPATIENT)
Dept: ADMINISTRATIVE | Facility: HOSPITAL | Age: 54
End: 2019-12-10

## 2019-12-10 ENCOUNTER — APPOINTMENT (OUTPATIENT)
Dept: LAB | Facility: HOSPITAL | Age: 54
End: 2019-12-10
Payer: COMMERCIAL

## 2019-12-10 DIAGNOSIS — Z79.899 OTHER LONG TERM (CURRENT) DRUG THERAPY: ICD-10-CM

## 2019-12-10 DIAGNOSIS — L81.1 CHLOASMA: ICD-10-CM

## 2019-12-10 DIAGNOSIS — L98.8 OTHER SPECIFIED DISORDERS OF THE SKIN AND SUBCUTANEOUS TISSUE: ICD-10-CM

## 2019-12-10 DIAGNOSIS — L98.8 OTHER SPECIFIED DISORDERS OF THE SKIN AND SUBCUTANEOUS TISSUE: Primary | ICD-10-CM

## 2019-12-10 DIAGNOSIS — L98.8 VASCULAR DISORDER OF SKIN: ICD-10-CM

## 2019-12-10 LAB
ALBUMIN SERPL BCP-MCNC: 4 G/DL (ref 3–5.2)
ALP SERPL-CCNC: 36 U/L (ref 43–122)
ALT SERPL W P-5'-P-CCNC: 38 U/L (ref 9–52)
ANION GAP SERPL CALCULATED.3IONS-SCNC: 7 MMOL/L (ref 5–14)
AST SERPL W P-5'-P-CCNC: 31 U/L (ref 14–36)
BASOPHILS # BLD AUTO: 0 THOUSANDS/ΜL (ref 0–0.1)
BASOPHILS NFR BLD AUTO: 1 % (ref 0–1)
BILIRUB SERPL-MCNC: 0.4 MG/DL
BUN SERPL-MCNC: 12 MG/DL (ref 5–25)
CALCIUM SERPL-MCNC: 9.3 MG/DL (ref 8.4–10.2)
CHLORIDE SERPL-SCNC: 104 MMOL/L (ref 97–108)
CO2 SERPL-SCNC: 27 MMOL/L (ref 22–30)
CREAT SERPL-MCNC: 0.65 MG/DL (ref 0.6–1.2)
EOSINOPHIL # BLD AUTO: 0.1 THOUSAND/ΜL (ref 0–0.4)
EOSINOPHIL NFR BLD AUTO: 1 % (ref 0–6)
ERYTHROCYTE [DISTWIDTH] IN BLOOD BY AUTOMATED COUNT: 13.2 %
GFR SERPL CREATININE-BSD FRML MDRD: 103 ML/MIN/1.73SQ M
GLUCOSE P FAST SERPL-MCNC: 97 MG/DL (ref 70–99)
HCT VFR BLD AUTO: 41 % (ref 36–46)
HGB BLD-MCNC: 14.2 G/DL (ref 12–16)
LYMPHOCYTES # BLD AUTO: 2.9 THOUSANDS/ΜL (ref 0.5–4)
LYMPHOCYTES NFR BLD AUTO: 46 % (ref 25–45)
MCH RBC QN AUTO: 32.6 PG (ref 26–34)
MCHC RBC AUTO-ENTMCNC: 34.5 G/DL (ref 31–36)
MCV RBC AUTO: 94 FL (ref 80–100)
MONOCYTES # BLD AUTO: 0.4 THOUSAND/ΜL (ref 0.2–0.9)
MONOCYTES NFR BLD AUTO: 7 % (ref 1–10)
NEUTROPHILS # BLD AUTO: 2.8 THOUSANDS/ΜL (ref 1.8–7.8)
NEUTS SEG NFR BLD AUTO: 45 % (ref 45–65)
PLATELET # BLD AUTO: 286 THOUSANDS/UL (ref 150–450)
PMV BLD AUTO: 9.1 FL (ref 8.9–12.7)
POTASSIUM SERPL-SCNC: 5.5 MMOL/L (ref 3.6–5)
PROT SERPL-MCNC: 6.9 G/DL (ref 5.9–8.4)
RBC # BLD AUTO: 4.35 MILLION/UL (ref 4–5.2)
SODIUM SERPL-SCNC: 138 MMOL/L (ref 137–147)
WBC # BLD AUTO: 6.3 THOUSAND/UL (ref 4.5–11)

## 2019-12-10 PROCEDURE — 85025 COMPLETE CBC W/AUTO DIFF WBC: CPT

## 2019-12-10 PROCEDURE — 36415 COLL VENOUS BLD VENIPUNCTURE: CPT

## 2019-12-10 PROCEDURE — 80053 COMPREHEN METABOLIC PANEL: CPT

## 2019-12-12 ENCOUNTER — APPOINTMENT (OUTPATIENT)
Dept: LAB | Facility: HOSPITAL | Age: 54
End: 2019-12-12
Payer: COMMERCIAL

## 2019-12-12 DIAGNOSIS — L98.8 OTHER SPECIFIED DISORDERS OF THE SKIN AND SUBCUTANEOUS TISSUE: ICD-10-CM

## 2019-12-12 DIAGNOSIS — Z79.899 OTHER LONG TERM (CURRENT) DRUG THERAPY: ICD-10-CM

## 2019-12-12 DIAGNOSIS — L81.1 CHLOASMA: ICD-10-CM

## 2019-12-12 DIAGNOSIS — L98.8 VASCULAR DISORDER OF SKIN: ICD-10-CM

## 2019-12-12 LAB — POTASSIUM SERPL-SCNC: 4.4 MMOL/L (ref 3.6–5)

## 2019-12-12 PROCEDURE — 36415 COLL VENOUS BLD VENIPUNCTURE: CPT

## 2019-12-12 PROCEDURE — 84132 ASSAY OF SERUM POTASSIUM: CPT

## 2019-12-16 PROBLEM — L93.0 LUPUS ERYTHEMATOSUS: Status: ACTIVE | Noted: 2018-09-05

## 2019-12-18 ENCOUNTER — HOSPITAL ENCOUNTER (OUTPATIENT)
Dept: RADIOLOGY | Facility: CLINIC | Age: 54
Discharge: HOME | End: 2019-12-18
Attending: ANESTHESIOLOGY
Payer: COMMERCIAL

## 2019-12-18 VITALS
DIASTOLIC BLOOD PRESSURE: 71 MMHG | SYSTOLIC BLOOD PRESSURE: 110 MMHG | TEMPERATURE: 98.3 F | OXYGEN SATURATION: 98 % | RESPIRATION RATE: 18 BRPM | HEART RATE: 70 BPM

## 2019-12-18 DIAGNOSIS — M54.16 LUMBAR RADICULOPATHY: ICD-10-CM

## 2019-12-18 DIAGNOSIS — M47.816 LUMBAR SPONDYLOSIS: ICD-10-CM

## 2019-12-18 PROCEDURE — 64484 NJX AA&/STRD TFRM EPI L/S EA: CPT | Performed by: ANESTHESIOLOGY

## 2019-12-18 PROCEDURE — 64483 NJX AA&/STRD TFRM EPI L/S 1: CPT | Performed by: ANESTHESIOLOGY

## 2019-12-18 RX ORDER — LIDOCAINE HYDROCHLORIDE 10 MG/ML
5 INJECTION, SOLUTION EPIDURAL; INFILTRATION; INTRACAUDAL; PERINEURAL ONCE
Status: COMPLETED | OUTPATIENT
Start: 2019-12-18 | End: 2019-12-18

## 2019-12-18 RX ORDER — PAPAVERINE HCL 150 MG
20 CAPSULE, EXTENDED RELEASE ORAL ONCE
Status: COMPLETED | OUTPATIENT
Start: 2019-12-18 | End: 2019-12-18

## 2019-12-18 RX ADMIN — LIDOCAINE HYDROCHLORIDE 4 ML: 10 INJECTION, SOLUTION EPIDURAL; INFILTRATION; INTRACAUDAL; PERINEURAL at 08:40

## 2019-12-18 RX ADMIN — LIDOCAINE HYDROCHLORIDE 2 ML: 20 INJECTION, SOLUTION EPIDURAL; INFILTRATION; INTRACAUDAL; PERINEURAL at 08:44

## 2019-12-18 RX ADMIN — DEXAMETHASONE SODIUM PHOSPHATE 15 MG: 10 INJECTION, SOLUTION INTRAMUSCULAR; INTRAVENOUS at 08:44

## 2019-12-18 RX ADMIN — IOHEXOL 2 ML: 300 INJECTION, SOLUTION INTRAVENOUS at 08:42

## 2019-12-18 NOTE — DISCHARGE INSTRUCTIONS
Epidural Steroid Injection   WHAT YOU NEED TO KNOW:   An epidural steroid injection (BEULAH) is a procedure to inject steroid medicine into the epidural space  The epidural space is between your spinal cord and vertebrae  Steroids reduce inflammation and fluid buildup in your spine that may be causing pain  You may be given pain medicine along with the steroids  ACTIVITY  · Do not drive or operate machinery today  · No strenuous activity today - bending, lifting, etc   · You may resume normal activites starting tomorrow - start slowly and as tolerated  · You may shower today, but no tub baths or hot tubs  · You may have numbness for several hours from the local anesthetic  Please use caution and common sense, especially with weight-bearing activities  CARE OF THE INJECTION SITE  · If you have soreness or pain, apply ice to the area today (20 minutes on/20 minutes off)  · Starting tomorrow, you may use warm, moist heat or ice if needed  · You may have an increase or change in your discomfort for 36-48 hours after your treatment  · Apply ice and continue with any pain medication you have been prescribed  · Notify the [facility] if you have any of the following: redness, drainage, swelling, headache, stiff neck or fever above 100°F     SPECIAL INSTRUCTIONS  · Our office will contact you in approximately 7 days for a progress report  MEDICATIONS  · Continue to take all routine medications  · Our office may have instructed you to hold some medications  If you have a problem specifically related to your procedure, please call our office at [phone]  Problems not related to your procedure should be directed to your primary care physician

## 2019-12-18 NOTE — H&P
History of Present Illness: The patient is a 48 y o  female who presents with complaints of low back and left leg pain      Patient Active Problem List   Diagnosis    Migraine without aura or status migrainosus    Low vitamin D level    Insomnia    Acne    Lupus erythematosus    Lumbar radiculopathy    Ischial bursitis, left    Trochanteric bursitis of left hip    Chronic pain syndrome    Chronic bilateral low back pain without sciatica    Lumbar spondylosis       Past Medical History:   Diagnosis Date    Acne 02/06/2018    Greater trochanteric bursitis of left hip 05/10/2019    Headache 02/06/2018    Hypertension     Insomnia 02/06/2018    Ischial bursitis of left side 05/10/2019    Left-sided low back pain with left-sided sciatica 05/10/2019    Lupus erythematosus     Vitamin D deficiency        Past Surgical History:   Procedure Laterality Date    CT NEEDLE BX ASPIRATION INJECTION LOCALIZATION  5/20/2019    TONSILLECTOMY           Current Outpatient Medications:     ergocalciferol (VITAMIN D2) 50,000 units, TAKE ONE CAPSULE BY MOUTH ONE TIME PER WEEK, Disp: 12 capsule, Rfl: 1    gabapentin (NEURONTIN) 400 mg capsule, Take 2 PO HS x 5 days, then increase to 3 PO HS, Disp: 90 capsule, Rfl: 1    hydroxychloroquine (PLAQUENIL) 200 mg tablet, Take 200 mg by mouth 2 (two) times a day, Disp: , Rfl: 0    ibuprofen (MOTRIN) 600 mg tablet, Take 1 tablet (600 mg total) by mouth every 6 (six) hours as needed for mild pain, Disp: 120 tablet, Rfl: 2    metoprolol succinate (TOPROL-XL) 50 mg 24 hr tablet, Take 50 mg by mouth daily, Disp: , Rfl: 3    ZOLMitriptan (ZOMIG) 2 5 MG tablet, TAKE 1 TABLET BY MOUTH ONCE-MAY REPEAT AFTER 2 HOURS IF NEEDED,NO MORE THAN 10 MG/24 HRS, Disp: 8 tablet, Rfl: 3    Allergies   Allergen Reactions    Other     Pylera [Bis Subcit-Metronid-Tetracyc] Nausea Only    Sulfa Antibiotics Nausea Only       Physical Exam:   Vitals:    12/18/19 0814   BP: 106/71   Pulse: 63 Resp: 18   Temp: 98 3 °F (36 8 °C)   SpO2: 98%     General: Awake, Alert, Oriented x 3, Mood and affect appropriate  Respiratory: Respirations even and unlabored  Cardiovascular: Peripheral pulses intact; no edema  Musculoskeletal Exam:  Left lumbar paraspinals tender to palpation  ASA Score: 2    Patient/Chart Verification  Patient ID Verified: Verbal  ID Band Applied: No  Consents Confirmed: Procedural  H&P( within 30 days) Verified: To be obtained in the Pre-Procedure area  Interval H&P(within 24 hr) Complete (required for Outpatients and Surgery Admit only): To be obtained in the Pre-Procedure area  Allergies Reviewed: Yes  Anticoag/NSAID held?: No(denies)  Currently on antibiotics?: No  Pregnancy denied?: Yes  Pre-op Lab/Test Results Available: N/A  Pregnancy Lab Collected: N/A comment    Assessment:   1  Lumbar radiculopathy    2   Lumbar spondylosis        Plan: Left L4 and L5 TFESI

## 2019-12-26 ENCOUNTER — TELEPHONE (OUTPATIENT)
Dept: PAIN MEDICINE | Facility: CLINIC | Age: 54
End: 2019-12-26

## 2019-12-26 NOTE — TELEPHONE ENCOUNTER
2nd  attempt  lm to cb with % improvement and pain level     Please obtain this information if pt calls back

## 2019-12-30 NOTE — TELEPHONE ENCOUNTER
**this is a patient of Dr Jeff Mosley, not Dr Spencer Vincent ev  Cancelled pts LT ISCHIAL BURSA UNDER USGI that was scheduled on 01/02/20- please read previous message from

## 2019-12-30 NOTE — TELEPHONE ENCOUNTER
Previously, pt requested call back from nurse with next step? Not sure what she is looking for but appt for USGI injection was cancelled

## 2019-12-30 NOTE — TELEPHONE ENCOUNTER
Patient says that she feels so much better  She stopped taking her ibuprofen  Her pain level is a 0/10, the injection helped her about 80-90%  She would also like to cancel her procedure that she has scheduled on 1/2/2020  She is also asking that the nurse gives her a call back to would like to know her next step thank you

## 2019-12-31 NOTE — TELEPHONE ENCOUNTER
Attempted to speak with the patient in regards to the previous tasks  Looks like she has no pain and cancelled her USGI but would like to know the next step? Left a detailed mom in regards to her having no pain but if the pain returned then she should call the office and schedule an office visit with the provider

## 2020-02-11 ENCOUNTER — TELEPHONE (OUTPATIENT)
Dept: PAIN MEDICINE | Facility: CLINIC | Age: 55
End: 2020-02-11

## 2020-02-11 NOTE — TELEPHONE ENCOUNTER
Kj Sarmiento (Pharmacists ) at [facility]   States the patient did not call for a refill, it was only ready-fill

## 2020-02-11 NOTE — TELEPHONE ENCOUNTER
Philip Rubin from [place] [id number] stated that received a drug interaction warning with the ibuprofen (MOTRIN) 600 mg tablet and metoprolol succinate (TOPROL-XL) 50     The insurance stated they will not cover it because the interaction with the high  blood pressure medication may not be effective  Please advise luis Rubin @ [facility] # [phone]

## 2020-02-25 NOTE — TELEPHONE ENCOUNTER
[facility] called regarding below and   Would like to s/w to help resolve this for pt     [facility]# [phone]

## 2020-02-26 NOTE — TELEPHONE ENCOUNTER
02/26/2020    [facility] called regarding below and   Would like to s/w to help resolve this for pt      [facility]# [phone]             Amalia from [facility] stated that received a drug interaction warning with the ibuprofen (MOTRIN) 600 mg tablet and metoprolol succinate (TOPROL-XL) 50     The insurance stated they will not cover it because the interaction with the high  blood pressure medication may not be effective         Please advise luis Umanzor @ [facility] # [phone]

## 2020-02-26 NOTE — TELEPHONE ENCOUNTER
I contacted the pharmacy and they stated her insurance will not approve it because of the interaction, so script was cancelled  If her cardiologist approves then it may be reordered  Otherwise they would probably keep ordering  Just FYI  Can you chart that under meds if possible?  Thanks

## 2020-02-26 NOTE — TELEPHONE ENCOUNTER
Attempted to call the patient to clarify and LMOM to CB  Just CLAY   Attempted to call the patient to clarify if she is taking it  Please advise   Thanks

## 2020-02-26 NOTE — TELEPHONE ENCOUNTER
As long as it isn't causing issues and cardiology/PCP never told her to avoid NSAIDs, she can continue to take it as needed

## 2020-06-17 ENCOUNTER — TELEPHONE (OUTPATIENT)
Dept: PAIN MEDICINE | Facility: CLINIC | Age: 55
End: 2020-06-17

## 2020-06-17 NOTE — TELEPHONE ENCOUNTER
Patient is requesting her medical records to be faxed to her new [facility]  Please faxed records to fax number listed below, thx    Attn: Eli Cielo  [address], [address]    PFO#[phone]  Phone# [phone]     Patient's call  Back# [phone]  Please call pt with status!

## 2020-06-17 NOTE — TELEPHONE ENCOUNTER
ERROR     She is saying that she doesn't want another injection  She wanted to come into the office instead   However she said her insurance changed and she needs to call her insurance company first      She will give us a call back

## 2020-06-17 NOTE — TELEPHONE ENCOUNTER
Patient   [phone]  Dr Lonnie Kerns    Patient is calling in stating that she would like another injection  The pain has come back, the main pain is on her leg  There is a burring feeling, she can also feel it in her foot and back as well

## 2020-06-29 ENCOUNTER — TELEPHONE (OUTPATIENT)
Dept: PAIN MEDICINE | Facility: CLINIC | Age: 55
End: 2020-06-29

## 2021-01-13 ENCOUNTER — LAB (OUTPATIENT)
Dept: LAB | Age: 56
End: 2021-01-13
Payer: COMMERCIAL

## 2021-01-13 ENCOUNTER — TRANSCRIBE ORDERS (OUTPATIENT)
Dept: URGENT CARE | Age: 56
End: 2021-01-13

## 2021-01-13 DIAGNOSIS — R21 RASH AND OTHER NONSPECIFIC SKIN ERUPTION: Primary | ICD-10-CM

## 2021-01-13 DIAGNOSIS — R21 RASH AND OTHER NONSPECIFIC SKIN ERUPTION: ICD-10-CM

## 2021-01-13 PROCEDURE — 86255 FLUORESCENT ANTIBODY SCREEN: CPT

## 2021-01-13 PROCEDURE — 86225 DNA ANTIBODY NATIVE: CPT

## 2021-01-13 PROCEDURE — 86235 NUCLEAR ANTIGEN ANTIBODY: CPT

## 2021-01-13 PROCEDURE — 36415 COLL VENOUS BLD VENIPUNCTURE: CPT

## 2021-01-14 LAB
DSDNA AB SER-ACNC: 1 IU/ML (ref 0–9)
ENA RNP AB SER-ACNC: <0.2 AI (ref 0–0.9)
ENA SM AB SER-ACNC: 0.2 AI (ref 0–0.9)
ENA SS-A AB SER-ACNC: <0.2 AI (ref 0–0.9)
ENA SS-B AB SER-ACNC: <0.2 AI (ref 0–0.9)

## 2021-01-15 LAB
C-ANCA TITR SER IF: NORMAL TITER
MYELOPEROXIDASE AB SER IA-ACNC: <9 U/ML (ref 0–9)
P-ANCA ATYPICAL TITR SER IF: NORMAL TITER
P-ANCA TITR SER IF: NORMAL TITER
PROTEINASE3 AB SER IA-ACNC: <3.5 U/ML (ref 0–3.5)

## 2021-01-21 ENCOUNTER — OFFICE VISIT (OUTPATIENT)
Dept: URGENT CARE | Age: 56
End: 2021-01-21
Payer: COMMERCIAL

## 2021-01-21 VITALS
OXYGEN SATURATION: 100 % | HEIGHT: 63 IN | TEMPERATURE: 98 F | HEART RATE: 76 BPM | RESPIRATION RATE: 18 BRPM | WEIGHT: 162 LBS | BODY MASS INDEX: 28.7 KG/M2 | SYSTOLIC BLOOD PRESSURE: 120 MMHG | DIASTOLIC BLOOD PRESSURE: 60 MMHG

## 2021-01-21 DIAGNOSIS — Z98.890 STATUS POST BIOPSY OF SKIN: ICD-10-CM

## 2021-01-21 DIAGNOSIS — Z48.02 ENCOUNTER FOR REMOVAL OF SUTURES: Primary | ICD-10-CM

## 2021-01-21 PROCEDURE — 99213 OFFICE O/P EST LOW 20 MIN: CPT | Performed by: NURSE PRACTITIONER

## 2021-01-21 NOTE — PROGRESS NOTES
NAME: Adriana Davis is a 50 y o  female  : 1965    MRN: [id number]    /60   Pulse 76   Temp 98 °F (36 7 °C)   Resp 18   Ht 5' 3" (1 6 m)   Wt 73 5 kg (162 lb)   SpO2 100%   BMI 28 70 kg/m²     Assessment and Plan   Encounter for removal of sutures [Z48 02]  1  Encounter for removal of sutures     2  Status post biopsy of skin         Jhon was seen today for suture / staple removal     Diagnoses and all orders for this visit:    Encounter for removal of sutures    Status post biopsy of skin        Patient Instructions   Patient Instructions   Sutures removed from the face, no complications, aware to follow up with pcp and derm      Proceed to the nearest ER if symptoms worsen, Follow up with your PCP  Continue to social distance, wash your hands, and wear your masks  Please continue to follow the CDC  gov guidelines daily for they are subject to change on COVID-19    Chief Complaint     Chief Complaint   Patient presents with    Suture / Staple Removal     pt had biopsys done on  to her face  pt is is her for removal  pt had procedure done at [facility]  History of Present Illness     49 yo female here today to have three sutures removed from the face that were placed by derm on   They healed well and ready for removal  2 under the left eye noted and one on the right side above the upper lip to be removed  No s/s of infection      Review of Systems   Review of Systems   Skin: Positive for wound (sutures to be removed)           Current Medications       Current Outpatient Medications:     ALPRAZolam (XANAX) 0 25 mg tablet, Take 1 tablet (0 25 mg total) by mouth daily at bedtime as needed for anxiety, Disp: 30 tablet, Rfl: 0    ergocalciferol (VITAMIN D2) 50,000 units, TAKE ONE CAPSULE BY MOUTH ONE TIME PER WEEK, Disp: 12 capsule, Rfl: 1    gabapentin (NEURONTIN) 400 mg capsule, Take 2 PO HS x 5 days, then increase to 3 PO HS, Disp: 90 capsule, Rfl: 1    hydroxychloroquine (PLAQUENIL) 200 mg tablet, Take 200 mg by mouth 2 (two) times a day, Disp: , Rfl: 0    ibuprofen (MOTRIN) 600 mg tablet, Take 1 tablet (600 mg total) by mouth every 6 (six) hours as needed for mild pain, Disp: 120 tablet, Rfl: 2    metoprolol succinate (TOPROL-XL) 50 mg 24 hr tablet, TAKE 1 TABLET BY MOUTH EVERY DAY, Disp: 90 tablet, Rfl: 1    Saccharomyces boulardii (Probiotic) 250 MG CAPS, Take 1 capsule (250 mg total) by mouth daily, Disp: 90 each, Rfl: 0    ZOLMitriptan (ZOMIG) 2 5 MG tablet, TAKE 1 TABLET BY MOUTH ONCE  MAY REPEAT ONCE IN 2 HOURS IF NEEDED , Disp: 12 tablet, Rfl: 1    Current Allergies     Allergies as of 01/21/2021 - Reviewed 01/21/2021   Allergen Reaction Noted    Amoxicillin  09/02/2020    Ceftin [cefuroxime]  09/02/2020    Clarithromycin  09/02/2020    Minocycline  09/02/2020    Other  02/06/2018    Pylera [bis subcit-metronid-tetracyc] Nausea Only 06/26/2018    Sulfa antibiotics Nausea Only 06/07/2018              Past Medical History:   Diagnosis Date    Acne 02/06/2018    Greater trochanteric bursitis of left hip 05/10/2019    Headache 02/06/2018    Hypertension     Insomnia 02/06/2018    Ischial bursitis of left side 05/10/2019    Left-sided low back pain with left-sided sciatica 05/10/2019    Lupus erythematosus     Vitamin D deficiency        Past Surgical History:   Procedure Laterality Date    CT NEEDLE BX ASPIRATION INJECTION LOCALIZATION  5/20/2019    TONSILLECTOMY         Family History   Problem Relation Age of Onset    No Known Problems Mother     No Known Problems Father          Medications have been verified      The following portions of the patient's history were reviewed and updated as appropriate: allergies, current medications, past family history, past medical history, past social history, past surgical history and problem list     Objective   /60   Pulse 76   Temp 98 °F (36 7 °C)   Resp 18   Ht 5' 3" (1 6 m)   Wt 73 5 kg (162 lb) SpO2 100%   BMI 28 70 kg/m²      Physical Exam     Physical Exam  Skin:     General: Skin is warm  Findings: No erythema  Comments: Sutures were placed in by derm after doing bx of the area on 1/12  Neurological:      Mental Status: She is alert  Note: Portions of this record may have been created with voice recognition software  Occasional wrong word or "sound a like" substitutions may have occurred due to the inherent limitations of voice recognition software  Please read the chart carefully and recognize, using context, where substitutions have occurred  STEPHEN Henley

## 2021-03-16 ENCOUNTER — LAB (OUTPATIENT)
Dept: LAB | Age: 56
End: 2021-03-16
Payer: COMMERCIAL

## 2021-03-16 DIAGNOSIS — I10 BENIGN HYPERTENSION: ICD-10-CM

## 2021-03-16 DIAGNOSIS — R10.11 RUQ PAIN: ICD-10-CM

## 2021-03-16 LAB
ALBUMIN SERPL BCP-MCNC: 3.7 G/DL (ref 3.5–5)
ALP SERPL-CCNC: 45 U/L (ref 46–116)
ALT SERPL W P-5'-P-CCNC: 27 U/L (ref 12–78)
ANION GAP SERPL CALCULATED.3IONS-SCNC: 3 MMOL/L (ref 4–13)
AST SERPL W P-5'-P-CCNC: 14 U/L (ref 5–45)
BACTERIA UR QL AUTO: NORMAL /HPF
BASOPHILS # BLD AUTO: 0.05 THOUSANDS/ΜL (ref 0–0.1)
BASOPHILS NFR BLD AUTO: 1 % (ref 0–1)
BILIRUB SERPL-MCNC: 0.31 MG/DL (ref 0.2–1)
BILIRUB UR QL STRIP: NEGATIVE
BUN SERPL-MCNC: 15 MG/DL (ref 5–25)
CALCIUM SERPL-MCNC: 9 MG/DL (ref 8.3–10.1)
CHLORIDE SERPL-SCNC: 109 MMOL/L (ref 100–108)
CHOLEST SERPL-MCNC: 239 MG/DL (ref 50–200)
CLARITY UR: CLEAR
CO2 SERPL-SCNC: 28 MMOL/L (ref 21–32)
COLOR UR: YELLOW
CREAT SERPL-MCNC: 0.87 MG/DL (ref 0.6–1.3)
EOSINOPHIL # BLD AUTO: 0.08 THOUSAND/ΜL (ref 0–0.61)
EOSINOPHIL NFR BLD AUTO: 1 % (ref 0–6)
ERYTHROCYTE [DISTWIDTH] IN BLOOD BY AUTOMATED COUNT: 12.7 % (ref 11.6–15.1)
GFR SERPL CREATININE-BSD FRML MDRD: 76 ML/MIN/1.73SQ M
GLUCOSE P FAST SERPL-MCNC: 95 MG/DL (ref 65–99)
GLUCOSE UR STRIP-MCNC: NEGATIVE MG/DL
HCT VFR BLD AUTO: 42.2 % (ref 34.8–46.1)
HDLC SERPL-MCNC: 73 MG/DL
HGB BLD-MCNC: 13.6 G/DL (ref 11.5–15.4)
HGB UR QL STRIP.AUTO: NEGATIVE
IMM GRANULOCYTES # BLD AUTO: 0.05 THOUSAND/UL (ref 0–0.2)
IMM GRANULOCYTES NFR BLD AUTO: 1 % (ref 0–2)
KETONES UR STRIP-MCNC: NEGATIVE MG/DL
LDLC SERPL CALC-MCNC: 147 MG/DL (ref 0–100)
LEUKOCYTE ESTERASE UR QL STRIP: ABNORMAL
LYMPHOCYTES # BLD AUTO: 3.98 THOUSANDS/ΜL (ref 0.6–4.47)
LYMPHOCYTES NFR BLD AUTO: 54 % (ref 14–44)
MCH RBC QN AUTO: 31 PG (ref 26.8–34.3)
MCHC RBC AUTO-ENTMCNC: 32.2 G/DL (ref 31.4–37.4)
MCV RBC AUTO: 96 FL (ref 82–98)
MONOCYTES # BLD AUTO: 0.48 THOUSAND/ΜL (ref 0.17–1.22)
MONOCYTES NFR BLD AUTO: 7 % (ref 4–12)
NEUTROPHILS # BLD AUTO: 2.64 THOUSANDS/ΜL (ref 1.85–7.62)
NEUTS SEG NFR BLD AUTO: 36 % (ref 43–75)
NITRITE UR QL STRIP: NEGATIVE
NON-SQ EPI CELLS URNS QL MICRO: NORMAL /HPF
NONHDLC SERPL-MCNC: 166 MG/DL
NRBC BLD AUTO-RTO: 0 /100 WBCS
PH UR STRIP.AUTO: 6 [PH]
PLATELET # BLD AUTO: 262 THOUSANDS/UL (ref 149–390)
PMV BLD AUTO: 11 FL (ref 8.9–12.7)
POTASSIUM SERPL-SCNC: 4.7 MMOL/L (ref 3.5–5.3)
PROT SERPL-MCNC: 6.8 G/DL (ref 6.4–8.2)
PROT UR STRIP-MCNC: NEGATIVE MG/DL
RBC # BLD AUTO: 4.39 MILLION/UL (ref 3.81–5.12)
RBC #/AREA URNS AUTO: NORMAL /HPF
SODIUM SERPL-SCNC: 140 MMOL/L (ref 136–145)
SP GR UR STRIP.AUTO: 1.02 (ref 1–1.03)
TRIGL SERPL-MCNC: 94 MG/DL
UROBILINOGEN UR QL STRIP.AUTO: 0.2 E.U./DL
WBC # BLD AUTO: 7.28 THOUSAND/UL (ref 4.31–10.16)
WBC #/AREA URNS AUTO: NORMAL /HPF

## 2021-03-16 PROCEDURE — 85025 COMPLETE CBC W/AUTO DIFF WBC: CPT

## 2021-03-16 PROCEDURE — 80061 LIPID PANEL: CPT

## 2021-03-16 PROCEDURE — 81001 URINALYSIS AUTO W/SCOPE: CPT

## 2021-03-16 PROCEDURE — 80053 COMPREHEN METABOLIC PANEL: CPT

## 2021-03-16 PROCEDURE — 36415 COLL VENOUS BLD VENIPUNCTURE: CPT

## 2021-09-22 ENCOUNTER — APPOINTMENT (OUTPATIENT)
Dept: LAB | Age: 56
End: 2021-09-22
Payer: COMMERCIAL

## 2021-09-22 DIAGNOSIS — L98.8 OTHER SPECIFIED DISORDERS OF THE SKIN AND SUBCUTANEOUS TISSUE: ICD-10-CM

## 2021-09-22 DIAGNOSIS — L81.1 CHLOASMA: ICD-10-CM

## 2021-09-22 DIAGNOSIS — Z79.899 ENCOUNTER FOR LONG-TERM (CURRENT) USE OF OTHER MEDICATIONS: ICD-10-CM

## 2021-09-22 LAB
ALBUMIN SERPL BCP-MCNC: 3.8 G/DL (ref 3.5–5)
ALP SERPL-CCNC: 56 U/L (ref 46–116)
ALT SERPL W P-5'-P-CCNC: 43 U/L (ref 12–78)
ANION GAP SERPL CALCULATED.3IONS-SCNC: 5 MMOL/L (ref 4–13)
AST SERPL W P-5'-P-CCNC: 24 U/L (ref 5–45)
BASOPHILS # BLD AUTO: 0.02 THOUSANDS/ΜL (ref 0–0.1)
BASOPHILS NFR BLD AUTO: 0 % (ref 0–1)
BILIRUB SERPL-MCNC: 0.36 MG/DL (ref 0.2–1)
BUN SERPL-MCNC: 12 MG/DL (ref 5–25)
CALCIUM SERPL-MCNC: 9.4 MG/DL (ref 8.3–10.1)
CHLORIDE SERPL-SCNC: 108 MMOL/L (ref 100–108)
CO2 SERPL-SCNC: 25 MMOL/L (ref 21–32)
CREAT SERPL-MCNC: 0.64 MG/DL (ref 0.6–1.3)
EOSINOPHIL # BLD AUTO: 0.05 THOUSAND/ΜL (ref 0–0.61)
EOSINOPHIL NFR BLD AUTO: 1 % (ref 0–6)
ERYTHROCYTE [DISTWIDTH] IN BLOOD BY AUTOMATED COUNT: 12.9 % (ref 11.6–15.1)
GFR SERPL CREATININE-BSD FRML MDRD: 102 ML/MIN/1.73SQ M
GLUCOSE SERPL-MCNC: 83 MG/DL (ref 65–140)
HCT VFR BLD AUTO: 42.2 % (ref 34.8–46.1)
HGB BLD-MCNC: 13.5 G/DL (ref 11.5–15.4)
IMM GRANULOCYTES # BLD AUTO: 0.02 THOUSAND/UL (ref 0–0.2)
IMM GRANULOCYTES NFR BLD AUTO: 0 % (ref 0–2)
LYMPHOCYTES # BLD AUTO: 2.39 THOUSANDS/ΜL (ref 0.6–4.47)
LYMPHOCYTES NFR BLD AUTO: 43 % (ref 14–44)
MCH RBC QN AUTO: 31.1 PG (ref 26.8–34.3)
MCHC RBC AUTO-ENTMCNC: 32 G/DL (ref 31.4–37.4)
MCV RBC AUTO: 97 FL (ref 82–98)
MONOCYTES # BLD AUTO: 0.34 THOUSAND/ΜL (ref 0.17–1.22)
MONOCYTES NFR BLD AUTO: 6 % (ref 4–12)
NEUTROPHILS # BLD AUTO: 2.79 THOUSANDS/ΜL (ref 1.85–7.62)
NEUTS SEG NFR BLD AUTO: 50 % (ref 43–75)
NRBC BLD AUTO-RTO: 0 /100 WBCS
PLATELET # BLD AUTO: 225 THOUSANDS/UL (ref 149–390)
PMV BLD AUTO: 11.5 FL (ref 8.9–12.7)
POTASSIUM SERPL-SCNC: 4.6 MMOL/L (ref 3.5–5.3)
PROT SERPL-MCNC: 7.3 G/DL (ref 6.4–8.2)
RBC # BLD AUTO: 4.34 MILLION/UL (ref 3.81–5.12)
SODIUM SERPL-SCNC: 138 MMOL/L (ref 136–145)
WBC # BLD AUTO: 5.61 THOUSAND/UL (ref 4.31–10.16)

## 2021-09-22 PROCEDURE — 85025 COMPLETE CBC W/AUTO DIFF WBC: CPT

## 2021-09-22 PROCEDURE — 36415 COLL VENOUS BLD VENIPUNCTURE: CPT

## 2021-09-22 PROCEDURE — 80053 COMPREHEN METABOLIC PANEL: CPT

## 2021-10-14 ENCOUNTER — LAB (OUTPATIENT)
Dept: LAB | Age: 56
End: 2021-10-14
Payer: COMMERCIAL

## 2021-10-14 DIAGNOSIS — E78.2 MIXED HYPERLIPIDEMIA: ICD-10-CM

## 2021-10-14 DIAGNOSIS — E55.9 VITAMIN D DEFICIENCY: ICD-10-CM

## 2021-10-14 DIAGNOSIS — R19.7 DIARRHEA, UNSPECIFIED TYPE: ICD-10-CM

## 2021-10-14 DIAGNOSIS — R10.84 GENERALIZED ABDOMINAL PAIN: ICD-10-CM

## 2021-10-14 LAB
25(OH)D3 SERPL-MCNC: 35.7 NG/ML (ref 30–100)
CHOLEST SERPL-MCNC: 250 MG/DL (ref 50–200)
HDLC SERPL-MCNC: 77 MG/DL
LDLC SERPL CALC-MCNC: 142 MG/DL (ref 0–100)
NONHDLC SERPL-MCNC: 173 MG/DL
TRIGL SERPL-MCNC: 156 MG/DL

## 2021-10-14 PROCEDURE — 86255 FLUORESCENT ANTIBODY SCREEN: CPT

## 2021-10-14 PROCEDURE — 87338 HPYLORI STOOL AG IA: CPT

## 2021-10-14 PROCEDURE — 82784 ASSAY IGA/IGD/IGG/IGM EACH: CPT

## 2021-10-14 PROCEDURE — 86003 ALLG SPEC IGE CRUDE XTRC EA: CPT

## 2021-10-14 PROCEDURE — 80061 LIPID PANEL: CPT

## 2021-10-14 PROCEDURE — 82306 VITAMIN D 25 HYDROXY: CPT

## 2021-10-14 PROCEDURE — 83516 IMMUNOASSAY NONANTIBODY: CPT

## 2021-10-14 PROCEDURE — 82785 ASSAY OF IGE: CPT

## 2021-10-14 PROCEDURE — 36415 COLL VENOUS BLD VENIPUNCTURE: CPT

## 2021-10-14 PROCEDURE — 87493 C DIFF AMPLIFIED PROBE: CPT

## 2021-10-15 LAB
ALLERGEN COMMENT: NORMAL
ALMOND IGE QN: <0.1 KUA/I
CASHEW NUT IGE QN: <0.1 KUA/I
CODFISH IGE QN: <0.1 KUA/I
EGG WHITE IGE QN: <0.1 KUA/I
ENDOMYSIUM IGA SER QL: NEGATIVE
GLIADIN PEPTIDE IGA SER-ACNC: 3 UNITS (ref 0–19)
GLIADIN PEPTIDE IGG SER-ACNC: 1 UNITS (ref 0–19)
GLUTEN IGE QN: <0.1 KUA/I
H PYLORI AG STL QL IA: POSITIVE
HAZELNUT IGE QN: <0.1 KUA/L
IGA SERPL-MCNC: 157 MG/DL (ref 87–352)
MILK IGE QN: <0.1 KUA/I
PEANUT IGE QN: <0.1 KUA/I
SALMON IGE QN: <0.1 KUA/I
SCALLOP IGE QN: <0.1 KUA/L
SESAME SEED IGE QN: <0.1 KUA/I
SHRIMP IGE QN: <0.1 KUA/L
SOYBEAN IGE QN: <0.1 KUA/I
TOTAL IGE SMQN RAST: 20.9 KU/L (ref 0–113)
TTG IGA SER-ACNC: <2 U/ML (ref 0–3)
TTG IGG SER-ACNC: 3 U/ML (ref 0–5)
TUNA IGE QN: <0.1 KUA/I
WALNUT IGE QN: <0.1 KUA/I
WHEAT IGE QN: <0.1 KUA/I

## 2021-10-16 LAB — C DIFF TOX GENS STL QL NAA+PROBE: NEGATIVE

## 2021-11-03 ENCOUNTER — APPOINTMENT (OUTPATIENT)
Dept: RADIOLOGY | Age: 56
End: 2021-11-03
Payer: COMMERCIAL

## 2021-11-03 DIAGNOSIS — M25.559 HIP PAIN: ICD-10-CM

## 2021-11-03 PROCEDURE — 73522 X-RAY EXAM HIPS BI 3-4 VIEWS: CPT

## 2021-11-09 ENCOUNTER — OFFICE VISIT (OUTPATIENT)
Dept: BARIATRICS | Facility: CLINIC | Age: 56
End: 2021-11-09
Payer: COMMERCIAL

## 2021-11-09 VITALS
SYSTOLIC BLOOD PRESSURE: 128 MMHG | WEIGHT: 182.3 LBS | BODY MASS INDEX: 32.3 KG/M2 | TEMPERATURE: 96.9 F | DIASTOLIC BLOOD PRESSURE: 72 MMHG | HEART RATE: 68 BPM | RESPIRATION RATE: 16 BRPM | HEIGHT: 63 IN

## 2021-11-09 DIAGNOSIS — G43.009 MIGRAINE WITHOUT AURA OR STATUS MIGRAINOSUS: ICD-10-CM

## 2021-11-09 DIAGNOSIS — E66.9 OBESITY, CLASS I, BMI 30-34.9: Primary | ICD-10-CM

## 2021-11-09 DIAGNOSIS — L93.0 LUPUS ERYTHEMATOSUS: ICD-10-CM

## 2021-11-09 PROCEDURE — 99244 OFF/OP CNSLTJ NEW/EST MOD 40: CPT | Performed by: PHYSICIAN ASSISTANT

## 2021-11-10 ENCOUNTER — LAB (OUTPATIENT)
Dept: LAB | Age: 56
End: 2021-11-10
Payer: COMMERCIAL

## 2021-11-10 DIAGNOSIS — E66.9 OBESITY, CLASS I, BMI 30-34.9: ICD-10-CM

## 2021-11-10 DIAGNOSIS — L93.0 LUPUS ERYTHEMATOSUS: ICD-10-CM

## 2021-11-10 DIAGNOSIS — G43.009 MIGRAINE WITHOUT AURA OR STATUS MIGRAINOSUS: ICD-10-CM

## 2021-11-10 LAB
ALBUMIN SERPL BCP-MCNC: 3.5 G/DL (ref 3.5–5)
ALP SERPL-CCNC: 54 U/L (ref 46–116)
ALT SERPL W P-5'-P-CCNC: 36 U/L (ref 12–78)
ANION GAP SERPL CALCULATED.3IONS-SCNC: 8 MMOL/L (ref 4–13)
AST SERPL W P-5'-P-CCNC: 20 U/L (ref 5–45)
BILIRUB SERPL-MCNC: 0.38 MG/DL (ref 0.2–1)
BUN SERPL-MCNC: 16 MG/DL (ref 5–25)
CALCIUM SERPL-MCNC: 9.2 MG/DL (ref 8.3–10.1)
CHLORIDE SERPL-SCNC: 107 MMOL/L (ref 100–108)
CO2 SERPL-SCNC: 28 MMOL/L (ref 21–32)
CREAT SERPL-MCNC: 0.75 MG/DL (ref 0.6–1.3)
EST. AVERAGE GLUCOSE BLD GHB EST-MCNC: 105 MG/DL
GFR SERPL CREATININE-BSD FRML MDRD: 91 ML/MIN/1.73SQ M
GLUCOSE P FAST SERPL-MCNC: 98 MG/DL (ref 65–99)
HBA1C MFR BLD: 5.3 %
INSULIN SERPL-ACNC: 6.7 MU/L (ref 3–25)
POTASSIUM SERPL-SCNC: 4.6 MMOL/L (ref 3.5–5.3)
PROT SERPL-MCNC: 6.9 G/DL (ref 6.4–8.2)
SODIUM SERPL-SCNC: 143 MMOL/L (ref 136–145)
TSH SERPL DL<=0.05 MIU/L-ACNC: 1.34 UIU/ML (ref 0.36–3.74)

## 2021-11-10 PROCEDURE — 84443 ASSAY THYROID STIM HORMONE: CPT

## 2021-11-10 PROCEDURE — 83036 HEMOGLOBIN GLYCOSYLATED A1C: CPT

## 2021-11-10 PROCEDURE — 36415 COLL VENOUS BLD VENIPUNCTURE: CPT

## 2021-11-10 PROCEDURE — 83525 ASSAY OF INSULIN: CPT

## 2021-11-10 PROCEDURE — 80053 COMPREHEN METABOLIC PANEL: CPT

## 2021-11-26 ENCOUNTER — TELEPHONE (OUTPATIENT)
Dept: PAIN MEDICINE | Facility: CLINIC | Age: 56
End: 2021-11-26

## 2021-12-03 ENCOUNTER — TELEPHONE (OUTPATIENT)
Dept: GASTROENTEROLOGY | Facility: HOSPITAL | Age: 56
End: 2021-12-03

## 2021-12-06 ENCOUNTER — ANESTHESIA (OUTPATIENT)
Dept: GASTROENTEROLOGY | Facility: HOSPITAL | Age: 56
End: 2021-12-06

## 2021-12-06 ENCOUNTER — ANESTHESIA EVENT (OUTPATIENT)
Dept: GASTROENTEROLOGY | Facility: HOSPITAL | Age: 56
End: 2021-12-06

## 2021-12-06 ENCOUNTER — HOSPITAL ENCOUNTER (OUTPATIENT)
Dept: GASTROENTEROLOGY | Facility: HOSPITAL | Age: 56
Setting detail: OUTPATIENT SURGERY
Discharge: HOME | End: 2021-12-06
Attending: COLON & RECTAL SURGERY | Admitting: COLON & RECTAL SURGERY
Payer: COMMERCIAL

## 2021-12-06 VITALS
DIASTOLIC BLOOD PRESSURE: 68 MMHG | WEIGHT: 182.32 LBS | BODY MASS INDEX: 32.3 KG/M2 | HEIGHT: 63 IN | HEART RATE: 67 BPM | TEMPERATURE: 97.8 F | SYSTOLIC BLOOD PRESSURE: 114 MMHG | RESPIRATION RATE: 12 BRPM | OXYGEN SATURATION: 97 %

## 2021-12-06 DIAGNOSIS — Z83.71 FAMILY HISTORY OF COLONIC POLYPS: ICD-10-CM

## 2021-12-06 DIAGNOSIS — Z12.11 ENCOUNTER FOR SCREENING FOR MALIGNANT NEOPLASM OF COLON: ICD-10-CM

## 2021-12-06 PROCEDURE — 88305 TISSUE EXAM BY PATHOLOGIST: CPT | Performed by: PATHOLOGY

## 2021-12-06 RX ORDER — PROPOFOL 10 MG/ML
INJECTION, EMULSION INTRAVENOUS AS NEEDED
Status: DISCONTINUED | OUTPATIENT
Start: 2021-12-06 | End: 2021-12-06

## 2021-12-06 RX ORDER — SODIUM CHLORIDE 9 MG/ML
125 INJECTION, SOLUTION INTRAVENOUS CONTINUOUS
Status: DISCONTINUED | OUTPATIENT
Start: 2021-12-06 | End: 2021-12-10 | Stop reason: HOSPADM

## 2021-12-06 RX ADMIN — PROPOFOL 50 MG: 10 INJECTION, EMULSION INTRAVENOUS at 10:02

## 2021-12-06 RX ADMIN — PROPOFOL 50 MG: 10 INJECTION, EMULSION INTRAVENOUS at 09:59

## 2021-12-06 RX ADMIN — PROPOFOL 50 MG: 10 INJECTION, EMULSION INTRAVENOUS at 10:05

## 2021-12-06 RX ADMIN — SODIUM CHLORIDE 125 ML/HR: 0.9 INJECTION, SOLUTION INTRAVENOUS at 09:43

## 2021-12-06 RX ADMIN — PROPOFOL 50 MG: 10 INJECTION, EMULSION INTRAVENOUS at 09:56

## 2021-12-06 RX ADMIN — PROPOFOL 100 MG: 10 INJECTION, EMULSION INTRAVENOUS at 09:54

## 2021-12-10 ENCOUNTER — TELEPHONE (OUTPATIENT)
Dept: PAIN MEDICINE | Facility: CLINIC | Age: 56
End: 2021-12-10

## 2021-12-29 ENCOUNTER — OFFICE VISIT (OUTPATIENT)
Dept: BARIATRICS | Facility: CLINIC | Age: 56
End: 2021-12-29

## 2021-12-29 DIAGNOSIS — E66.9 OBESITY, CLASS I, BMI 30-34.9: Primary | ICD-10-CM

## 2021-12-29 PROCEDURE — RECHECK

## 2022-01-06 ENCOUNTER — TELEPHONE (OUTPATIENT)
Dept: BARIATRICS | Facility: CLINIC | Age: 57
End: 2022-01-06

## 2022-01-27 ENCOUNTER — TELEPHONE (OUTPATIENT)
Dept: PAIN MEDICINE | Facility: CLINIC | Age: 57
End: 2022-01-27

## 2022-03-08 NOTE — PROGRESS NOTES
Assessment:  1  Chronic pain syndrome    2  Chronic bilateral low back pain without sciatica    3  Lumbar radiculopathy    4  Lumbar spondylosis        Plan:  While the patient and her daughter were in the office today, I did have a thorough conversation with him regarding her chronic pain syndrome, symptoms, and medication regimen/treatment plan options  I did explain to the patient and her daughter that at this point, if she want to, we could proceed with a repeat left L4 and L5 transforaminal epidural steroid injection with Dr Becky Soto and see how she does  However, for now, the patient wants to hold off on any repeat injections as she feels that she would like to save it in case her pain significantly worsens  I did explain to the patient that if between now and her next office visit her pain symptoms should worsen and she would like to proceed with the repeat injection, she should call our office  The patient was agreeable and verbalized an understanding  With regards to the gabapentin, I did have a thorough conversation with the patient and her daughter and explained that a normal side effect of gabapentin initially can be sleepiness and sometimes the longer patient is on it it does improve  However, explained to the patient at this point I feel would be in her best interest to increase the gabapentin to 400 mg at bedtime for the next 3 months and see how she does  I explained the patient that maybe in time we can slowly and steadily increase the gabapentin to a more therapeutic dose as I explained to the patient and her daughter that Gabapentin is the type of medication that it does not matter when she takes a, but rather how much she takes every day consistently  The patient and her daughter were agreeable and verbalized understanding   I advised the patient that if they experience any side effects or issues with the changes in their medication regiment, they should give our office a call to activities related to improving balance, coordination, kinesthetic sense, posture, motor skill, proprioception. (78703)    Therapeutic Activities:     [] Therapeutic activities, direct (one-on-one) patient contact (use of dynamic activities to improve functional performance). (36277)    Gait:   [] Provided training and instruction to the patient for ambulation re-education. (98542)    Self-Care/ADL's  [] Self-care/home management training and compensatory training, meal preparation, safety procedures, and instructions in use of assistive technology devices/adaptive equipment, direct one-on-one contact. (31538)    Home Exercise Program:  T-band TKE & HS curl   [x] Reviewed/Progressed HEP activities related to strengthening, flexibility, endurance, ROM. (41571)  [] Reviewed/Progressed HEP activities related to improving balance, coordination, kinesthetic sense, posture, motor skill, proprioception.  (79427)    Manual Treatments:    [] Provided manual therapy to mobilize soft tissue/joints for the purpose of modulating pain, promoting relaxation,  increasing ROM, reducing/eliminating soft tissue swelling/inflammation/restriction, improving soft tissue extensibility.  (21916)    Service Based Modalities:      Timed Code Treatment Minutes:    39' there ex      Total Treatment Minutes:   39'     Treatment/Activity Tolerance:  [x] Patient tolerated treatment well [] Patient limited by fatique  [] Patient limited by pain  [] Patient limited by other medical complications  [] Other:     Prognosis: [x] Good [] Fair  [] Poor    Patient Requires Follow-up: [x] Yes  [] No      Goals:  Short term goals  Time Frame for Short term goals: 1 week  Short term goal 1: Start HEP- met   Long term goals  Time Frame for Long term goals : 6 weeks  Long term goal 1: L knee pain controlled at 2-3/10 to allow more normal gait    Long term goal 2: 5-/5 strength for joint stability   Long term goal 3: Step up/ down 8\" steps in reciprocal fashion discuss  I also advised the patient not to drive or operate machinery until they see how the changes in the medication regimen affects them  The patient was agreeable and verbalized an understanding  The patient will follow-up in 12 weeks for medication prescription refill and reevaluation  The patient was advised to contact the office should their symptoms worsen in the interim  The patient was agreeable and verbalized an understanding  History of Present Illness: The patient is a 48 y o  female last seen on 7/5/19 who presents for a follow up office visit in regards to chronic pain syndrome secondary to lumbar spondylosis  The patient currently reports that since her last office visit overall her pain symptoms have slightly improved as she is still noting 40-50% relief as a result of her 2nd left L4 and L5 transforaminal epidural steroid injection on September 11, 2019 with Dr Zane Nolan  She reports that initially she had almost complete relief of her pain symptoms, however, over the past few weeks the pain has slowly returned, but is still not as bad as it was prior to the injections  She reports that she did try to titrate down the gabapentin to less than 300 mg a day and her pain also worsened  She is currently taking 300 mg at bedtime as she tried to take 100 in the morning and 300 at bedtime and it was making her too sleepy  The patient and her daughter present today to discuss her medication regimen and treatment plan  Current pain medications includes:  Gabapentin 300 mg at bedtime  The patient reports that this regimen is providing 40-50% pain relief  The patient is reporting no side effects from this pain medication regimen  I have personally reviewed and/or updated the patient's past medical history, past surgical history, family history, social history, current medications, allergies, and vital signs today         Review of Systems:    Review of Systems   Respiratory: Negative (step ups with 8 inch step)  Long term goal 4: Tolerate standing, without devices 3-4 hr  (unable)  Long term goal 5: RTW, starting at modified duty  (working in sit down position)        Plan:   [x] Continue per plan of care [] Alter current plan (see comments)  [] Plan of care initiated [] Hold pending MD visit [] Discharge    Plan for Next Session:  Progress per pt tolerance.     Electronically signed by:  May Black PTA for shortness of breath  Cardiovascular: Negative for chest pain  Gastrointestinal: Negative for constipation, diarrhea, nausea and vomiting  Musculoskeletal: Negative for arthralgias, gait problem, joint swelling and myalgias  Skin: Negative for rash  Neurological: Negative for dizziness, seizures and weakness  All other systems reviewed and are negative          Past Medical History:   Diagnosis Date    Acne 02/06/2018    Greater trochanteric bursitis of left hip 05/10/2019    Headache 02/06/2018    Hypertension     Insomnia 02/06/2018    Ischial bursitis of left side 05/10/2019    Left-sided low back pain with left-sided sciatica 05/10/2019    Lupus erythematosus     Vitamin D deficiency        Past Surgical History:   Procedure Laterality Date    CT NEEDLE BX ASPIRATION INJECTION LOCALIZATION  5/20/2019    TONSILLECTOMY         Family History   Problem Relation Age of Onset    No Known Problems Mother     No Known Problems Father        Social History     Occupational History    Not on file   Tobacco Use    Smoking status: Never Smoker    Smokeless tobacco: Never Used   Substance and Sexual Activity    Alcohol use: No    Drug use: No    Sexual activity: Not on file         Current Outpatient Medications:     ergocalciferol (VITAMIN D2) 50,000 units, TAKE ONE CAPSULE BY MOUTH ONE TIME PER WEEK, Disp: 12 capsule, Rfl: 1    hydroxychloroquine (PLAQUENIL) 200 mg tablet, Take 200 mg by mouth 2 (two) times a day, Disp: , Rfl: 0    metoprolol succinate (TOPROL-XL) 50 mg 24 hr tablet, Take 50 mg by mouth daily, Disp: , Rfl: 3    naproxen (NAPROSYN) 500 mg tablet, Take 500 mg by mouth 2 (two) times a day with meals, Disp: , Rfl:     ZOLMitriptan (ZOMIG) 2 5 MG tablet, TAKE 1 TABLET BY MOUTH ONCE-MAY REPEAT AFTER 2 HOURS IF NEEDED,NO MORE THAN 10 MG/24 HRS, Disp: 8 tablet, Rfl: 3    gabapentin (NEURONTIN) 400 mg capsule, Take 1 PO HS , Disp: 30 capsule, Rfl: 0    Allergies   Allergen Reactions    Pylera [Bis Subcit-Metronid-Tetracyc] Nausea Only    Sulfa Antibiotics Nausea Only       Physical Exam:    BP 97/75   Pulse 64   Ht 5' 3" (1 6 m)   Wt 72 1 kg (159 lb)   BMI 28 17 kg/m²     Constitutional:normal, well developed, well nourished, alert, in no distress and non-toxic and no overt pain behavior  Eyes:anicteric  HEENT:grossly intact  Neck:supple, symmetric, trachea midline and no masses   Pulmonary:even and unlabored  Cardiovascular:No edema or pitting edema present  Skin:Normal without rashes or lesions and well hydrated  Psychiatric:Mood and affect appropriate  Neurologic:Cranial Nerves II-XII grossly intact  Musculoskeletal:The patient's gait is slightly antalgic, but steady without the use of any assistive devices  Imaging  No orders to display         No orders of the defined types were placed in this encounter

## 2023-10-03 ENCOUNTER — NEW PATIENT (OUTPATIENT)
Dept: URBAN - METROPOLITAN AREA CLINIC 6 | Facility: CLINIC | Age: 58
End: 2023-10-03

## 2023-10-03 DIAGNOSIS — H04.123: ICD-10-CM

## 2023-10-03 DIAGNOSIS — H43.393: ICD-10-CM

## 2023-10-03 DIAGNOSIS — H25.813: ICD-10-CM

## 2023-10-03 PROCEDURE — 92250 FUNDUS PHOTOGRAPHY W/I&R: CPT

## 2023-10-03 PROCEDURE — 92004 COMPRE OPH EXAM NEW PT 1/>: CPT

## 2023-10-03 ASSESSMENT — TONOMETRY
OD_IOP_MMHG: 16
OS_IOP_MMHG: 13

## 2023-10-03 ASSESSMENT — VISUAL ACUITY
OU_CC: J1
OD_CC: 20/30-1
OS_CC: 20/25

## 2023-11-10 ENCOUNTER — FOLLOW UP (OUTPATIENT)
Dept: URBAN - METROPOLITAN AREA CLINIC 6 | Facility: CLINIC | Age: 58
End: 2023-11-10

## 2023-11-10 DIAGNOSIS — H35.033: ICD-10-CM

## 2023-11-10 DIAGNOSIS — H43.393: ICD-10-CM

## 2023-11-10 PROCEDURE — 92250 FUNDUS PHOTOGRAPHY W/I&R: CPT | Mod: NC

## 2023-11-10 PROCEDURE — 92014 COMPRE OPH EXAM EST PT 1/>: CPT

## 2023-11-10 PROCEDURE — 92202 OPSCPY EXTND ON/MAC DRAW: CPT | Mod: NC

## 2023-11-10 PROCEDURE — 92134 CPTRZ OPH DX IMG PST SGM RTA: CPT | Mod: NC

## 2023-11-10 ASSESSMENT — VISUAL ACUITY
OS_CC: 20/30
OD_CC: 20/30-2

## 2023-11-10 ASSESSMENT — TONOMETRY
OD_IOP_MMHG: 12
OS_IOP_MMHG: 12

## 2023-11-16 ENCOUNTER — HOSPITAL ENCOUNTER (OUTPATIENT)
Dept: NON INVASIVE DIAGNOSTICS | Facility: HOSPITAL | Age: 58
Discharge: HOME | End: 2023-11-16
Payer: COMMERCIAL

## 2023-11-16 VITALS — WEIGHT: 184 LBS | BODY MASS INDEX: 32.6 KG/M2 | HEIGHT: 63 IN

## 2023-11-16 DIAGNOSIS — R07.9 CHEST PAIN, UNSPECIFIED TYPE: ICD-10-CM

## 2023-11-16 LAB
MAX HR PERCENT: 85 %
MAX HR PERCENT: 95 %
MAX HR: 157 BPM
MAX HR: 157 BPM
RATE PRESSURE PRODUCT: NORMAL
SL CV STRESS RECOVERY BP: NORMAL MMHG
SL CV STRESS RECOVERY HR: 86 BPM
SL CV STRESS RECOVERY O2 SAT: 99 %
SL CV STRESS STAGE REACHED: 2
STRESS ANGINA INDEX: 1
STRESS BASELINE BP: NORMAL MMHG
STRESS BASELINE BP: NORMAL MMHG
STRESS BASELINE HR: 75 BPM
STRESS BASELINE HR: 79 BPM
STRESS O2 SAT REST: 98 %
STRESS PEAK HR: 157 BPM
STRESS POST ESTIMATED WORKLOAD: 7 METS
STRESS POST ESTIMATED WORKLOAD: 7 METS
STRESS POST EXERCISE DUR MIN: 5 MIN
STRESS POST O2 SAT PEAK: 99 %
STRESS POST PEAK BP: 170 MMHG
STRESS POST PEAK HR: 157 BPM
STRESS POST PEAK SYSTOLIC BP: 170 MMHG

## 2023-11-16 PROCEDURE — 93017 CV STRESS TEST TRACING ONLY: CPT

## 2023-11-16 PROCEDURE — 93018 CV STRESS TEST I&R ONLY: CPT | Performed by: INTERNAL MEDICINE

## 2023-11-16 PROCEDURE — 93016 CV STRESS TEST SUPVJ ONLY: CPT | Performed by: INTERNAL MEDICINE
